# Patient Record
Sex: FEMALE | Race: WHITE
[De-identification: names, ages, dates, MRNs, and addresses within clinical notes are randomized per-mention and may not be internally consistent; named-entity substitution may affect disease eponyms.]

---

## 2020-12-10 ENCOUNTER — HOSPITAL ENCOUNTER (OUTPATIENT)
Dept: HOSPITAL 75 - RAD | Age: 30
End: 2020-12-10
Attending: NURSE PRACTITIONER
Payer: COMMERCIAL

## 2020-12-10 DIAGNOSIS — Z34.92: Primary | ICD-10-CM

## 2020-12-10 DIAGNOSIS — Z3A.19: ICD-10-CM

## 2020-12-10 PROCEDURE — 76805 OB US >/= 14 WKS SNGL FETUS: CPT

## 2020-12-10 NOTE — DIAGNOSTIC IMAGING REPORT
INDICATION: Anatomy scan.



TECHNIQUE: Multiple real-time grayscale images were obtained over

the gravid uterus.



COMPARISON: None



FINDINGS: There is a single live fetus in a cephalic

presentation. Fetal heart rate was recorded at 160 bpm. Placenta

is anterior. Amniotic fluid volume is normal. Fetal kidneys,

bladder and stomach are unremarkable. Fetal brain is

unremarkable. There is four-chamber heart. There is three-vessel

cord with normal cord insertion. Fetal spine is unremarkable.



Biometrical measurements are as follows:

Biparietal 4.47 cm, age 19 weeks 4 days.

Head circumference 16.45 cm, age 19 weeks 2 days.

Abdominal circumference 14.18 cm, age 19 weeks 4 days.

Femur length 3.11 cm, age 19 weeks 5 days.



Sonographic estimate age: 19 weeks 4 days.

Sonographic estimated date of delivery: 05/02/2021.



Estimated Fetal Weight: 299 gm (+/- 44  gm).

LMP percentile: 53%.



Fetal heart rate: 160 beats per minute.



Fetal number: 1 of 1.



IMPRESSION: Single live IUP 19 weeks 4 days gestational age.

Estimated date of confinement sonographically is 05/02/2021.



Dictated by: 



  Dictated on workstation # [id number]

## 2021-02-05 ENCOUNTER — HOSPITAL ENCOUNTER (EMERGENCY)
Dept: HOSPITAL 75 - ER | Age: 31
Discharge: HOME | End: 2021-02-05
Payer: COMMERCIAL

## 2021-02-05 VITALS — HEIGHT: 63.78 IN | BODY MASS INDEX: 29.34 KG/M2 | WEIGHT: 169.76 LBS

## 2021-02-05 VITALS — SYSTOLIC BLOOD PRESSURE: 127 MMHG | DIASTOLIC BLOOD PRESSURE: 91 MMHG

## 2021-02-05 DIAGNOSIS — Z3A.27: ICD-10-CM

## 2021-02-05 DIAGNOSIS — R07.89: ICD-10-CM

## 2021-02-05 DIAGNOSIS — O26.892: Primary | ICD-10-CM

## 2021-02-05 DIAGNOSIS — Z20.822: ICD-10-CM

## 2021-02-05 PROCEDURE — 87635 SARS-COV-2 COVID-19 AMP PRB: CPT

## 2021-02-05 PROCEDURE — 99283 EMERGENCY DEPT VISIT LOW MDM: CPT

## 2021-02-05 NOTE — ED CHEST PAIN
General


Chief Complaint:  Chest Pain


Stated Complaint:  CP, 27 WKS PREGNANCY


Nursing Triage Note:  


ARRIVED VIA AMB TO ROOM 10 IN [facility] WITH COMPLAINTS OF WAKING UP 


WITH CHEST PAIN THIS AM.  WAS SENT HERE BY HER DR OFFICE.  PAIN IS REPRODUCABLE


Nursing Sepsis Screen:  No Definite Risk


Source:  patient


Exam Limitations:  no limitations





History of Present Illness


Date Seen by Provider:  Feb 5, 2021


Time Seen by Provider:  10:40


Initial Comments


This 30-year-old young lady presents to the emergency room with complaints of 

upper chest pain upon waking this morning.  Pain has been persistent.  It is 

exacerbated by expiration and by movement of the chest and arms.  She denies any

injury.  She has been doing work on preparing a nursery because she is 27 weeks 

gestational age.  She denies any cough, shortness of breath, GI symptoms, loss 

of taste or smell, or fever.  Dr. Pendleton is her obstetrician.





Allergies and Home Medications


Allergies


Coded Allergies:  


     No Known Drug Allergies (Unverified , 2/5/21)





Home Medications


No Active Prescriptions or Reported Meds





Patient Home Medication List


Home Medication List Reviewed:  Yes





Review of Systems


Review of Systems


Constitutional:  no symptoms reported


EENTM:  No Symptoms Reported


Respiratory:  See HPI


Cardiovascular:  See HPI


Gastrointestinal:  No Symptoms Reported


Genitourinary:  No Symptoms Reported


Musculoskeletal:  see HPI


Skin:  no symptoms reported


Psychiatric/Neurological:  No Symptoms Reported


Endocrine:  No Symptoms Reported


Hematologic/Lymphatic:  No Symptoms Reported





Past Medical-Social-Family Hx


Past Med/Social Hx:  Reviewed Nursing Past Med/Soc Hx


Patient Social History


Alcohol Use:  Denies Use


Smoking Status:  Never a Smoker


Recent Infectious Disease Expo:  No


Recent Hopitalizations:  No





Seasonal Allergies


Seasonal Allergies:  No





Past Medical History


Surgeries:  No


Respiratory:  No


Cardiac:  No


Neurological:  No


Pregnant:  Yes


Expected Date of Delivery:  May 3, 2021


Genitourinary:  No


Gastrointestinal:  No


Musculoskeletal:  No


Endocrine:  No


HEENT:  No


Cancer:  No


Psychosocial:  No


Integumentary:  No





Physical Exam


Vital Signs





Vital Signs - First Documented








 2/5/21





 10:35


 


Temp 36.0


 


Pulse 82


 


Resp 16


 


B/P (MAP) 127/91 (103)


 


Pulse Ox 99


 


O2 Delivery Room Air





Capillary Refill : Less Than 3 Seconds


Height, Weight, BMI


Height: '"


Weight: lbs. oz. kg; 29.00 BMI


Method:


General Appearance:  No Apparent Distress, WD/WN


HEENT:  PERRL/EOMI, Normal ENT Inspection


Neck:  Normal Inspection


Respiratory:  Lungs Clear, Normal Breath Sounds, No Accessory Muscle Use, No 

Respiratory Distress, Other (Upper central chest wall tender to palpation)


Cardiovascular:  Regular Rate, Rhythm, No Edema, Normal Peripheral Pulses


Gastrointestinal:  Normal Bowel Sounds, Non Tender, Soft, Other (Appropriately 

gravid for gestational age)


Extremity:  Normal Inspection, Non Tender, No Calf Tenderness, No Pedal Edema


Neurologic/Psychiatric:  Alert, Oriented x3, No Motor/Sensory Deficits, Normal 

Mood/Affect, CNs II-XII Norm as Tested


Skin:  Normal Color, Warm/Dry





Progress/Results/Core Measures


Results/Orders


Lab Results





Laboratory Tests








Test


 2/5/21


10:44 Range/Units


 


 


Coronavirus 2019 ([facility]IMI) Negative  Negative  








My Orders





Orders - RUSH HAMILTON MD


Ondansetron  Oral Dissolve Tab (Zofran (2/5/21 10:50)


Lidocaine 2% Viscous 15 Ml (Xylocaine Vi (2/5/21 11:00)


Antacid  Suspension (Mylanta  Suspension (2/5/21 11:00)


Antacid  Suspension (Mylanta  Suspension (2/5/21 10:48)


Lidocaine 2% Viscous 15 Ml (Xylocaine Vi (2/5/21 10:49)


Ondansetron  Oral Dissolve Tab (Zofran (2/5/21 10:49)


Covid 19 Inhouse Test (2/5/21 10:58)





Medications Given in ED





Current Medications








 Medications  Dose


 Ordered  Sig/Katarzyna


 Route  Start Time


 Stop Time Status Last Admin


Dose Admin


 


 Al Hydrox/Mg


 Hydrox/Simethicone  30 ml  ONCE  ONCE


 PO  2/5/21 11:00


 2/5/21 11:01 DC 2/5/21 10:54


30 ML


 


 Lidocaine HCl  15 ml  ONCE  ONCE


 PO  2/5/21 11:00


 2/5/21 11:01 DC 2/5/21 10:54


15 ML








Vital Signs/I&O











 2/5/21 2/5/21





 10:35 11:47


 


Temp 36.0 36.0


 


Pulse 82 82


 


Resp 16 16


 


B/P (MAP) 127/91 (103) 127/91 (103)


 


Pulse Ox 99 99


 


O2 Delivery Room Air 














Blood Pressure Mean:                    103











Progress


Progress Note :  


Progress Note


Patient was treated with Zofran and a GI cocktail.  This did not really improve 

her symptoms.  Pulmonary embolism was considered but suspicion was very low as 

she did not meet any of the PERC rule criteria.  Rapid Covid was negative.  

Ultimately patient was given reassurance and discharged.





Departure


Impression





   Primary Impression:  


   Chest wall pain


Disposition:  01 HOME, SELF-CARE


Condition:  Stable





Departure-Patient Inst.


Decision time for Depature:  11:36


Referrals:  


ALYSE ROLLE MD (PCP/Family)


Primary Care Physician


Patient Instructions:  Chest Pain That Is Not Caused by the Heart (DC)





Add. Discharge Instructions:  


The nature of your chest pain appears to be musculoskeletal.  You may take 

Tylenol (acetaminophen) up to 1000 mg every 6 hours as needed.  You may 

additionally try gentle heat or cool compresses in 20-minute intervals.


Return to care if you develop worsening or new symptoms such as shortness of 

breath, lightheadedness, deeper chest pain, vomiting, fever, etc.  Also consider

repeat COVID-19 testing if you get additional symptoms that could be associated 

with COVID-19.


Call with questions or concerns.





All discharge instructions reviewed with patient and/or family. Voiced 

understanding.


Scripts


No Active Prescriptions or Reported Meds





Copy


Copies To 1:   MIC PENDLETON JOSHUA T MD         Feb 5, 2021 11:10

## 2021-03-20 ENCOUNTER — HOSPITAL ENCOUNTER (OUTPATIENT)
Dept: HOSPITAL 75 - WSO | Age: 31
Setting detail: OBSERVATION
LOS: 2 days | Discharge: HOME | End: 2021-03-22
Attending: OBSTETRICS & GYNECOLOGY | Admitting: OBSTETRICS & GYNECOLOGY
Payer: COMMERCIAL

## 2021-03-20 VITALS — SYSTOLIC BLOOD PRESSURE: 109 MMHG | DIASTOLIC BLOOD PRESSURE: 71 MMHG

## 2021-03-20 VITALS — SYSTOLIC BLOOD PRESSURE: 116 MMHG | DIASTOLIC BLOOD PRESSURE: 89 MMHG

## 2021-03-20 VITALS — DIASTOLIC BLOOD PRESSURE: 72 MMHG | SYSTOLIC BLOOD PRESSURE: 114 MMHG

## 2021-03-20 VITALS — BODY MASS INDEX: 30.98 KG/M2 | HEIGHT: 64.02 IN | WEIGHT: 181.44 LBS

## 2021-03-20 VITALS — DIASTOLIC BLOOD PRESSURE: 70 MMHG | SYSTOLIC BLOOD PRESSURE: 108 MMHG

## 2021-03-20 DIAGNOSIS — O60.03: Primary | ICD-10-CM

## 2021-03-20 DIAGNOSIS — O26.893: ICD-10-CM

## 2021-03-20 DIAGNOSIS — Z79.899: ICD-10-CM

## 2021-03-20 DIAGNOSIS — Z3A.35: ICD-10-CM

## 2021-03-20 DIAGNOSIS — O99.013: ICD-10-CM

## 2021-03-20 DIAGNOSIS — M54.5: ICD-10-CM

## 2021-03-20 LAB
APTT PPP: YELLOW S
BACTERIA #/AREA URNS HPF: NEGATIVE /HPF
BASOPHILS # BLD AUTO: 0.1 10^3/UL (ref 0–0.1)
BASOPHILS NFR BLD AUTO: 0 % (ref 0–10)
BILIRUB UR QL STRIP: NEGATIVE
EOSINOPHIL # BLD AUTO: 0.2 10^3/UL (ref 0–0.3)
EOSINOPHIL NFR BLD AUTO: 2 % (ref 0–10)
FIBRINOGEN PPP-MCNC: CLEAR MG/DL
GLUCOSE UR STRIP-MCNC: NEGATIVE MG/DL
HCT VFR BLD CALC: 33 % (ref 35–52)
HGB BLD-MCNC: 11.1 G/DL (ref 11.5–16)
KETONES UR QL STRIP: NEGATIVE
LEUKOCYTE ESTERASE UR QL STRIP: NEGATIVE
LYMPHOCYTES # BLD AUTO: 2.7 10^3/UL (ref 1–4)
LYMPHOCYTES NFR BLD AUTO: 18 % (ref 12–44)
MANUAL DIFFERENTIAL PERFORMED BLD QL: NO
MCH RBC QN AUTO: 31 PG (ref 25–34)
MCHC RBC AUTO-ENTMCNC: 34 G/DL (ref 32–36)
MCV RBC AUTO: 90 FL (ref 80–99)
MONOCYTES # BLD AUTO: 1.5 10^3/UL (ref 0–1)
MONOCYTES NFR BLD AUTO: 10 % (ref 0–12)
NEUTROPHILS # BLD AUTO: 9.9 10^3/UL (ref 1.8–7.8)
NEUTROPHILS NFR BLD AUTO: 69 % (ref 42–75)
NITRITE UR QL STRIP: NEGATIVE
PH UR STRIP: 6 [PH] (ref 5–9)
PLATELET # BLD: 209 10^3/UL (ref 130–400)
PMV BLD AUTO: 10.4 FL (ref 9–12.2)
PROT UR QL STRIP: NEGATIVE
RBC #/AREA URNS HPF: (no result) /HPF
SP GR UR STRIP: <=1.005 (ref 1.02–1.02)
SQUAMOUS #/AREA URNS HPF: (no result) /HPF
WBC # BLD AUTO: 14.5 10^3/UL (ref 4.3–11)
WBC #/AREA URNS HPF: (no result) /HPF

## 2021-03-20 PROCEDURE — 86901 BLOOD TYPING SEROLOGIC RH(D): CPT

## 2021-03-20 PROCEDURE — 81000 URINALYSIS NONAUTO W/SCOPE: CPT

## 2021-03-20 PROCEDURE — 99211 OFF/OP EST MAY X REQ PHY/QHP: CPT

## 2021-03-20 PROCEDURE — 86850 RBC ANTIBODY SCREEN: CPT

## 2021-03-20 PROCEDURE — 96372 THER/PROPH/DIAG INJ SC/IM: CPT

## 2021-03-20 PROCEDURE — 86900 BLOOD TYPING SEROLOGIC ABO: CPT

## 2021-03-20 PROCEDURE — 96376 TX/PRO/DX INJ SAME DRUG ADON: CPT

## 2021-03-20 PROCEDURE — 87088 URINE BACTERIA CULTURE: CPT

## 2021-03-20 PROCEDURE — 85025 COMPLETE CBC W/AUTO DIFF WBC: CPT

## 2021-03-20 PROCEDURE — 36415 COLL VENOUS BLD VENIPUNCTURE: CPT

## 2021-03-20 PROCEDURE — 96374 THER/PROPH/DIAG INJ IV PUSH: CPT

## 2021-03-20 PROCEDURE — 76816 OB US FOLLOW-UP PER FETUS: CPT

## 2021-03-20 PROCEDURE — 96361 HYDRATE IV INFUSION ADD-ON: CPT

## 2021-03-20 RX ADMIN — BETAMETHASONE ACETATE AND BETAMETHASONE SODIUM PHOSPHATE SCH MG: 3; 3 INJECTION, SUSPENSION INTRA-ARTICULAR; INTRALESIONAL; INTRAMUSCULAR; SOFT TISSUE at 23:06

## 2021-03-20 NOTE — HISTORY & PHYSICAL-OB
OB - Chief Complaint & HPI


Date/Time


Date of Admission:


Date of Admission:


Date seen by a Provider:  Mar 20, 2021


Time Seen by a Provider:  20:00





Chief Complaint/History


OB-Reason for Admission/Chief:   Labor (Presents with complaint of 

irregular contractions (she thought BH contractions) and pelvic pressure.  

Emptying bladder fully, no constipation, no DC, No LOF, No VB, good FM.  On 

monitor was hemal irregularly at first.  Cervix closed.  But then noted to

be hemal every 3-4 minutes and pain associated with contractions.  2-3 

with sitting but 7 when lying down.  Though no cervical change, admitted for PTL

due to pain and continued contractions.  UA on admit very hydrated.  Plan 

admission for tocolysis, antibiotics for GBS prophylaxis and betamethasone for 

lung maturity.  To late to consider MgSO4 for neuro protection.  Will start 

procardia protocol.  )


Hx :  1


Hx Para:  0


Expected Date of Delivery:  Mar 20, 2021


Gestational Age in Weeks:  33


Gestational Age in Days:  5


Admission Nurse Assessment Rev:  Yes


Other


O+,-


HBsAg -


Hep C -


HIV -


Rub I


VDRL NR


GBS unknown





Allergies and Home Medications


Allergies


Coded Allergies:  


     No Known Drug Allergies (Unverified , 21)





Home Medications


Acyclovir 200 Mg/5 Ml Oral.susp, 200 MG PO TID, (Reported)


Pnv Cmb#21/Iron/Folic Acid 1 Each Tablet, 1 EACH PO DAILY, (Reported)





Patient Home Medication List


Home Medication List Reviewed:  Yes





OB - History


Hx of Present Pregnancy


Prenatal Care:  Yes


Ultrasounds:  Normal mid trimester US


Obstetrical Complications:  None


Medical Complications:  None


Other Pregnancy Concerns:


shingles outbreak after Coronavirus vaccine


Completing Acyclovir course





Obstetrical History


Hx :  1


Hx Para:  0


Hx Total # of Abortions (Spona:  0





Patient Past Medical History





recent shingles out break





Social History/Family History


Alcohol Use:  Denies Use


Recreational Drug Use:  No


Smoking Cessation:  Never smoker





Immunizations


Hepatitis B:  Yes


Tetanus Booster (TDap):  Less than 5yrs


Rubella:  immune


RPR/VDRL:  Negative


GBS Status:  Unknown


HBsAG:  Negative





OB - Admission Exam


Physical Exam


Vitals:





Vital Signs








 3/20/21





 20:28


 


Temp 37.0


 


Pulse 69


 


Resp 18


 


Pulse Ox 97


 


O2 Delivery Room Air








Heart:  Rhythm Normal


Lungs:  Clear


Abdomen:  Gravid


Cervical Dilatation:  None


Effacement:  50%


Membranes:  Intact


Fetal Heart Rate:  150's


Accelerations:  Accelerations Present


Decelerations:  No Decelerations


Short Term Variability:  Present


Long Term Variability:  Average (6-25)


Contractions on Admission:  < 5 Minutes Apart


Intensity:  Moderate


Labs





Laboratory Tests








Test


 3/20/21


20:20 Range/Units


 


 


Urine Color YELLOW   


 


Urine Clarity CLEAR   


 


Urine pH 6.0  5-9  


 


Urine Specific Gravity <=1.005  1.016-1.022  


 


Urine Protein NEGATIVE  NEGATIVE  


 


Urine Glucose (UA) NEGATIVE  NEGATIVE  


 


Urine Ketones NEGATIVE  NEGATIVE  


 


Urine Nitrite NEGATIVE  NEGATIVE  


 


Urine Bilirubin NEGATIVE  NEGATIVE  


 


Urine Urobilinogen 0.2  < = 1.0  MG/DL


 


Urine Leukocyte Esterase NEGATIVE  NEGATIVE  


 


Urine RBC (Auto) NEGATIVE  NEGATIVE  


 


Urine RBC NONE   /HPF


 


Urine WBC RARE   /HPF


 


Urine Squamous Epithelial


Cells 0-2 


  /HPF





 


Urine Crystals NONE   /LPF


 


Urine Bacteria NEGATIVE   /HPF


 


Urine Casts NONE   /LPF


 


Urine Mucus TRACE   /LPF


 


Urine Culture Indicated NO   











OB - Assessment/Plan/Diagnosis


Assessment


Assessment:  IUP - 


Admission Dx


1.  Low back pain


2.   labor


3.  33 5/7 weeks


4.  recent shingles outbreak


Admission Status:  Observation











MIC PENDLETON DO               Mar 20, 2021 22:39

## 2021-03-21 VITALS — DIASTOLIC BLOOD PRESSURE: 58 MMHG | SYSTOLIC BLOOD PRESSURE: 104 MMHG

## 2021-03-21 VITALS — DIASTOLIC BLOOD PRESSURE: 63 MMHG | SYSTOLIC BLOOD PRESSURE: 99 MMHG

## 2021-03-21 VITALS — DIASTOLIC BLOOD PRESSURE: 60 MMHG | SYSTOLIC BLOOD PRESSURE: 101 MMHG

## 2021-03-21 VITALS — SYSTOLIC BLOOD PRESSURE: 84 MMHG | DIASTOLIC BLOOD PRESSURE: 48 MMHG

## 2021-03-21 VITALS — DIASTOLIC BLOOD PRESSURE: 67 MMHG | SYSTOLIC BLOOD PRESSURE: 107 MMHG

## 2021-03-21 VITALS — DIASTOLIC BLOOD PRESSURE: 50 MMHG | SYSTOLIC BLOOD PRESSURE: 88 MMHG

## 2021-03-21 VITALS — SYSTOLIC BLOOD PRESSURE: 98 MMHG | DIASTOLIC BLOOD PRESSURE: 64 MMHG

## 2021-03-21 VITALS — DIASTOLIC BLOOD PRESSURE: 54 MMHG | SYSTOLIC BLOOD PRESSURE: 95 MMHG

## 2021-03-21 VITALS — DIASTOLIC BLOOD PRESSURE: 48 MMHG | SYSTOLIC BLOOD PRESSURE: 85 MMHG

## 2021-03-21 VITALS — SYSTOLIC BLOOD PRESSURE: 107 MMHG | DIASTOLIC BLOOD PRESSURE: 67 MMHG

## 2021-03-21 VITALS — SYSTOLIC BLOOD PRESSURE: 107 MMHG | DIASTOLIC BLOOD PRESSURE: 58 MMHG

## 2021-03-21 VITALS — DIASTOLIC BLOOD PRESSURE: 62 MMHG | SYSTOLIC BLOOD PRESSURE: 105 MMHG

## 2021-03-21 VITALS — SYSTOLIC BLOOD PRESSURE: 122 MMHG | DIASTOLIC BLOOD PRESSURE: 64 MMHG

## 2021-03-21 VITALS — DIASTOLIC BLOOD PRESSURE: 54 MMHG | SYSTOLIC BLOOD PRESSURE: 93 MMHG

## 2021-03-21 RX ADMIN — NIFEDIPINE SCH MG: 10 CAPSULE ORAL at 10:23

## 2021-03-21 RX ADMIN — NIFEDIPINE SCH MG: 10 CAPSULE ORAL at 23:07

## 2021-03-21 RX ADMIN — WATER SCH MLS/HR: 1 INJECTION INTRAMUSCULAR; INTRAVENOUS; SUBCUTANEOUS at 10:24

## 2021-03-21 RX ADMIN — NIFEDIPINE SCH MG: 10 CAPSULE ORAL at 02:35

## 2021-03-21 RX ADMIN — WATER SCH MLS/HR: 1 INJECTION INTRAMUSCULAR; INTRAVENOUS; SUBCUTANEOUS at 18:51

## 2021-03-21 RX ADMIN — NIFEDIPINE SCH MG: 10 CAPSULE ORAL at 14:57

## 2021-03-21 RX ADMIN — DOCUSATE SODIUM SCH MG: 100 CAPSULE ORAL at 08:30

## 2021-03-21 RX ADMIN — Medication SCH MG: at 20:52

## 2021-03-21 RX ADMIN — WATER SCH MLS/HR: 1 INJECTION INTRAMUSCULAR; INTRAVENOUS; SUBCUTANEOUS at 14:58

## 2021-03-21 RX ADMIN — WATER SCH MLS/HR: 1 INJECTION INTRAMUSCULAR; INTRAVENOUS; SUBCUTANEOUS at 23:06

## 2021-03-21 RX ADMIN — NIFEDIPINE SCH MG: 10 CAPSULE ORAL at 06:09

## 2021-03-21 RX ADMIN — BETAMETHASONE ACETATE AND BETAMETHASONE SODIUM PHOSPHATE SCH MG: 3; 3 INJECTION, SUSPENSION INTRA-ARTICULAR; INTRALESIONAL; INTRAMUSCULAR; SOFT TISSUE at 23:07

## 2021-03-21 RX ADMIN — DOCUSATE SODIUM SCH MG: 100 CAPSULE ORAL at 20:52

## 2021-03-21 RX ADMIN — WATER SCH MLS/HR: 1 INJECTION INTRAMUSCULAR; INTRAVENOUS; SUBCUTANEOUS at 06:09

## 2021-03-21 RX ADMIN — NIFEDIPINE SCH MG: 10 CAPSULE ORAL at 18:51

## 2021-03-21 RX ADMIN — WATER SCH MLS/HR: 1 INJECTION INTRAMUSCULAR; INTRAVENOUS; SUBCUTANEOUS at 02:36

## 2021-03-21 NOTE — PROGRESS NOTE
Progress Note


Assessment/Plan


Date Seen by Provider:  Mar 21, 2021


Time Seen by Provider:  09:30


Events since last exam


Received BMT x 1, tocolysis began with procardia, has received 3 doses so far.


BP low with procardia but patient is asymptomatic


continued to contract regularly, but less painfully until about 0500 when 

contractions spaced out.  And now are minimal


Pain improved but did require po medication last night


Assessment/Plan





                          VS - Last 72 Hours, by Label








 3/20/21 3/20/21 3/20/21 3/20/21





 20:28 21:00 23:07 23:23


 


Temp 37.0 37.0  


 


Pulse 69 69 62 61


 


Resp 18 18 18 18


 


B/P (MAP)  114/72 (86) 109/71 (84) 108/70 (83)


 


Pulse Ox 97 98 99 100


 


O2 Delivery Room Air   


 


    





 3/20/21 3/21/21 3/21/21 3/21/21





 23:40 00:00 01:00 02:00


 


Pulse 89 72 72 


 


Resp 18 18 18 18


 


B/P (MAP) 116/89 (98) 107/67 (80) 107/67 (80) 


 


Pulse Ox 98 99 99 





 3/21/21 3/21/21 3/21/21 3/21/21





 03:00 04:00 05:00 06:00


 


Temp 37.0   


 


Pulse 64 75 82 68


 


Resp 18 18 18 18


 


B/P (MAP) 93/54 (67) 107/58 (74) 84/48 (60) 88/50 (63)


 


    





 3/21/21   





 07:00   


 


Pulse 82   


 


Resp 18   


 


B/P (MAP) 85/48 (60)   


 


Pulse Ox 93   








Laboratory Tests








Test


 3/20/21


20:20 3/20/21


23:35 Range/Units


 


 


Urine Color YELLOW    


 


Urine Clarity CLEAR    


 


Urine pH 6.0   5-9  


 


Urine Specific Gravity <=1.005   1.016-1.022  


 


Urine Protein NEGATIVE   NEGATIVE  


 


Urine Glucose (UA) NEGATIVE   NEGATIVE  


 


Urine Ketones NEGATIVE   NEGATIVE  


 


Urine Nitrite NEGATIVE   NEGATIVE  


 


Urine Bilirubin NEGATIVE   NEGATIVE  


 


Urine Urobilinogen 0.2   < = 1.0  MG/DL


 


Urine Leukocyte Esterase NEGATIVE   NEGATIVE  


 


Urine RBC (Auto) NEGATIVE   NEGATIVE  


 


Urine RBC NONE    /HPF


 


Urine WBC RARE    /HPF


 


Urine Squamous Epithelial


Cells 0-2 


 


  /HPF





 


Urine Crystals NONE    /LPF


 


Urine Bacteria NEGATIVE    /HPF


 


Urine Casts NONE    /LPF


 


Urine Mucus TRACE    /LPF


 


Urine Culture Indicated NO    


 


White Blood Count


 


 14.5 H


 4.3-11.0


10^3/uL


 


Red Blood Count


 


 3.62 L


 3.80-5.11


10^6/uL


 


Hemoglobin  11.1 L 11.5-16.0  g/dL


 


Hematocrit  33 L 35-52  %


 


Mean Corpuscular Volume  90  80-99  fL


 


Mean Corpuscular Hemoglobin  31  25-34  pg


 


Mean Corpuscular Hemoglobin


Concent 


 34 


 32-36  g/dL





 


Red Cell Distribution Width  13.2  10.0-14.5  %


 


Platelet Count


 


 209 


 130-400


10^3/uL


 


Mean Platelet Volume  10.4  9.0-12.2  fL


 


Immature Granulocyte % (Auto)  1   %


 


Neutrophils (%) (Auto)  69  42-75  %


 


Lymphocytes (%) (Auto)  18  12-44  %


 


Monocytes (%) (Auto)  10  0-12  %


 


Eosinophils (%) (Auto)  2  0-10  %


 


Basophils (%) (Auto)  0  0-10  %


 


Neutrophils # (Auto)


 


 9.9 H


 1.8-7.8


10^3/uL


 


Lymphocytes # (Auto)


 


 2.7 


 1.0-4.0


10^3/uL


 


Monocytes # (Auto)


 


 1.5 H


 0.0-1.0


10^3/uL


 


Eosinophils # (Auto)


 


 0.2 


 0.0-0.3


10^3/uL


 


Basophils # (Auto)


 


 0.1 


 0.0-0.1


10^3/uL


 


Immature Granulocyte # (Auto)


 


 0.2 H


 0.0-0.1


10^3/uL





Plan to continue tocolysis until after 2nd dose of betamethasone.  2nd dose due 

at 2300 tonight 


Will continue admission until am.  BPP and US prior to DC tomorrow.





A:   contractions/labor at 33 6/7 weeks


Recent shingles outbreak after coronavirus vaccination





P:  tocolysis, Beta


Amp for GBS prophylaxis





Vitals


Last set of Vitals Signs





Vital Signs








  Date Time  Temp Pulse Resp B/P (MAP) Pulse Ox O2 Delivery O2 Flow Rate FiO2


 


3/21/21 07:00  82 18 85/48 (60) 93   


 


3/21/21 03:00 37.0       


 


3/20/21 20:28      Room Air  











Labs


Laboratory Tests


3/20/21 20:20: 


Urine Color YELLOW, Urine Clarity CLEAR, Urine pH 6.0, Urine Specific Gravity 

<=1.005, Urine Protein NEGATIVE, Urine Glucose (UA) NEGATIVE, Urine Ketones 

NEGATIVE, Urine Nitrite NEGATIVE, Urine Bilirubin NEGATIVE, Urine Urobilinogen 

0.2, Urine Leukocyte Esterase NEGATIVE, Urine RBC (Auto) NEGATIVE, Urine RBC 

NONE, Urine WBC RARE, Urine Squamous Epithelial Cells 0-2, Urine Crystals NONE, 

Urine Bacteria NEGATIVE, Urine Casts NONE, Urine Mucus TRACE, Urine Culture 

Indicated NO


3/20/21 23:35: 


White Blood Count 14.5H, Red Blood Count 3.62L, Hemoglobin 11.1L, Hematocrit 33L

, Mean Corpuscular Volume 90, Mean Corpuscular Hemoglobin 31, Mean Corpuscular 

Hemoglobin Concent 34, Red Cell Distribution Width 13.2, Platelet Count 209, 

Mean Platelet Volume 10.4, Immature Granulocyte % (Auto) 1, Neutrophils (%) 

(Auto) 69, Lymphocytes (%) (Auto) 18, Monocytes (%) (Auto) 10, Eosinophils (%) 

(Auto) 2, Basophils (%) (Auto) 0, Neutrophils # (Auto) 9.9H, Lymphocytes # 

(Auto) 2.7, Monocytes # (Auto) 1.5H, Eosinophils # (Auto) 0.2, Basophils # 

(Auto) 0.1, Immature Granulocyte # (Auto) 0.2H





Focused Exam


Respiratory:  Chest Non Tender


Cardiovascular:  Regular Rate, Rhythm











MIC PENDLETON DO               Mar 21, 2021 09:34

## 2021-03-22 VITALS — SYSTOLIC BLOOD PRESSURE: 90 MMHG | DIASTOLIC BLOOD PRESSURE: 52 MMHG

## 2021-03-22 VITALS — DIASTOLIC BLOOD PRESSURE: 59 MMHG | SYSTOLIC BLOOD PRESSURE: 105 MMHG

## 2021-03-22 VITALS — SYSTOLIC BLOOD PRESSURE: 98 MMHG | DIASTOLIC BLOOD PRESSURE: 54 MMHG

## 2021-03-22 RX ADMIN — DOCUSATE SODIUM SCH MG: 100 CAPSULE ORAL at 08:40

## 2021-03-22 RX ADMIN — NIFEDIPINE SCH MG: 10 CAPSULE ORAL at 02:48

## 2021-03-22 RX ADMIN — WATER SCH MLS/HR: 1 INJECTION INTRAMUSCULAR; INTRAVENOUS; SUBCUTANEOUS at 06:53

## 2021-03-22 RX ADMIN — WATER SCH MLS/HR: 1 INJECTION INTRAMUSCULAR; INTRAVENOUS; SUBCUTANEOUS at 02:48

## 2021-03-22 RX ADMIN — NIFEDIPINE SCH MG: 10 CAPSULE ORAL at 06:53

## 2021-03-22 RX ADMIN — NIFEDIPINE SCH MG: 10 CAPSULE ORAL at 10:50

## 2021-03-22 RX ADMIN — Medication SCH MG: at 08:40

## 2021-03-22 NOTE — DIAGNOSTIC IMAGING REPORT
INDICATION: Assessment for size, position, and dates.



TECHNIQUE: Multiple limited real-time grayscale images were

obtained over the gravid uterus.



COMPARISON: 12/10/2020.



FINDINGS: Limited obstetrical sonogram imaging demonstrates a

single viable intrauterine pregnancy to currently be in a

cephalic presentation. Normal amount of amniotic fluid. Placenta

is anterior and without evidence for previa. Probable placental

lake present.



Biometrical measurements are as follows:

Biparietal 8.7 cm, age 35 weeks 1 days.

Head circumference 32.20 cm, age 36 weeks 3 days.

Abdominal circumference 30.99 cm, age 35 weeks 0 days.

Femur length 6.47 cm, age 33 weeks 3 days.



Sonographic estimate age: 35 weeks 0 days.

Sonographic estimated date of delivery: 4/26/21.



Estimated Fetal Weight: 2489 gm 



Fetal heart rate: 109 beats per minute.



Fetal number: 1 of 1.



IMPRESSION:

1. Limited obstetrical sonogram imaging demonstrates a single

viable intrauterine pregnancy, currently in a cephalic

presentation. Full fetal anatomical assessment was not performed.

Sonographic estimated age is approximately 35 weeks 0 days for an

estimated date of delivery of April 26, 2021.



Dictated by: 



  Dictated on workstation # [id number]

## 2021-03-22 NOTE — PROGRESS NOTE
Standard Progress Note


Progress Notes/Assess & Plan


Date Seen by a Provider:  Mar 22, 2021


Time Seen by a Provider:  08:30


Progress/Assessment & Plan


Had some redness of the extremities with the procardia.  But has had minimal 

contractions.





She has received betamethasone, prophylatic antibtiotics and procardia. Will 

await the  and the [facility] and then DC to home


Final Diagnosis


 labor at 35 weeks, with tocolysis


Mild antepartum anemia











MIC PENDLETON DO               Mar 22, 2021 08:22

## 2021-03-22 NOTE — DISCHARGE INST-WOMEN'S SERVICE
Discharge Inst-Women's Serv


Depart Medication/Instructions


New, Converted or Re-Newed RX:  RX on Chart


Final Diagnosis


 labor


Problems Reviewed?:  Yes





Consults/Follow Up


Additional Follow Up:  Yes (1 week with Dr pendleton (NST))





Activity


Activity:  Activity as Tolerated


NO SMOKING:  NO SMOKING


Nothing Inside Vagina:  No Douching, No Saxon, No Tampons





Diet


Discharge Diet:  No Restrictions


Symptoms to Report to :  Fever Over 101 Degrees F





more than 6 contractions per hour for 2 consequetive hours.  vaginal


bleeding, leakage of fluid, decreased fetal movement.


For Any Problems or Questions:  Contact Your Physician











MIC PENDLETON DO               Mar 22, 2021 09:47

## 2021-03-29 ENCOUNTER — HOSPITAL ENCOUNTER (OUTPATIENT)
Dept: HOSPITAL 75 - LDRP | Age: 31
Discharge: HOME | End: 2021-03-29
Attending: OBSTETRICS & GYNECOLOGY
Payer: COMMERCIAL

## 2021-03-29 ENCOUNTER — HOSPITAL ENCOUNTER (EMERGENCY)
Dept: HOSPITAL 75 - ER | Age: 31
Discharge: HOME | End: 2021-03-29
Payer: COMMERCIAL

## 2021-03-29 VITALS — BODY MASS INDEX: 30.71 KG/M2 | WEIGHT: 179.9 LBS | HEIGHT: 63.98 IN

## 2021-03-29 VITALS — SYSTOLIC BLOOD PRESSURE: 102 MMHG | DIASTOLIC BLOOD PRESSURE: 65 MMHG

## 2021-03-29 VITALS — BODY MASS INDEX: 30.9 KG/M2 | HEIGHT: 64.02 IN | WEIGHT: 181 LBS

## 2021-03-29 VITALS — SYSTOLIC BLOOD PRESSURE: 109 MMHG | DIASTOLIC BLOOD PRESSURE: 67 MMHG

## 2021-03-29 DIAGNOSIS — O99.413: Primary | ICD-10-CM

## 2021-03-29 DIAGNOSIS — R00.2: ICD-10-CM

## 2021-03-29 DIAGNOSIS — Z3A.35: ICD-10-CM

## 2021-03-29 LAB
APTT PPP: YELLOW S
BACTERIA #/AREA URNS HPF: (no result) /HPF
BILIRUB UR QL STRIP: NEGATIVE
FIBRINOGEN PPP-MCNC: CLEAR MG/DL
GLUCOSE UR STRIP-MCNC: NEGATIVE MG/DL
KETONES UR QL STRIP: NEGATIVE
LEUKOCYTE ESTERASE UR QL STRIP: NEGATIVE
NITRITE UR QL STRIP: NEGATIVE
PH UR STRIP: 6.5 [PH] (ref 5–9)
PROT UR QL STRIP: NEGATIVE
RBC #/AREA URNS HPF: (no result) /HPF
SP GR UR STRIP: 1.01 (ref 1.02–1.02)
SQUAMOUS #/AREA URNS HPF: (no result) /HPF
WBC #/AREA URNS HPF: (no result) /HPF

## 2021-03-29 PROCEDURE — 99213 OFFICE O/P EST LOW 20 MIN: CPT

## 2021-03-29 PROCEDURE — 93005 ELECTROCARDIOGRAM TRACING: CPT

## 2021-03-29 PROCEDURE — 81000 URINALYSIS NONAUTO W/SCOPE: CPT

## 2021-03-29 NOTE — ED CARDIAC GENERAL
History of Present Illness


General


Chief Complaint:  Cardiac/General Problems


Stated Complaint:  HEART PALPITATIONS/DIZZY 35 WKS PREG


Source:  patient


Exam Limitations:  no limitations





History of Present Illness


Date Seen by Provider:  Mar 29, 2021


Time Seen by Provider:  09:04


Initial Comments


Patient is a 30-year-old female who presents to the emergency department today 

with a chief complaint of feeling heart palpitations this morning after she got 

up and started getting ready for her day.  Patient is approximately 35 weeks 

pregnant as a .  She gets her prenatal care through Dr. Pendleton.  Patient was 

seen about a week and a half ago with a diagnosis of  labor.  She was 

started on nifedipine at this time.  Patient denies any abdominal pain or 

cramping today.  No loss of fluid.  No urinary complaints.  No recent illnesses 

such as fevers, chills, cough or congestion.  Patient states that she was 

reading up about the nifedipine and read that it can cause tachycardia.  She is 

concerned about taking this medication during her pregnancy.





All other review of systems reviewed and negative except as stated above.


Timing/Duration:  1 hour


Severity:  mild


Location:  central


Activities at Onset:  activity


Prior CP/Workup:  no prior chest pain, no prior cardiac workup


NTG SL PTA:  No


ASA po PTA:  No





Allergies and Home Medications


Allergies


Coded Allergies:  


     No Known Drug Allergies (Unverified , 21)





Home Medications


Acetaminophen 500 Mg Tablet, 1,000 MG PO Q8H PRN for PAIN-MILD (1-4)


   Prescribed by: MIC PENDLETON on 3/22/21 0946


Acyclovir 200 Mg/5 Ml Oral.susp, 200 MG PO TID, (Reported)


Nifedipine 20 Mg Capsule, 20 MG PO Q6H


   Prescribed by: MIC PENDLETON on 3/22/21 0946


Oxycodone Hcl 5 Mg Tab, 5 MG PO Q6H PRN for PAIN-SEVERE (8-10)


   Prescribed by: MIC PENDLETON on 3/22/21 0946


Pnv Cmb#21/Iron/Folic Acid 1 Each Tablet, 1 EACH PO DAILY, (Reported)





Patient Home Medication List


Home Medication List Reviewed:  Yes





Review of Systems


Review of Systems


Constitutional:  see HPI


EENTM:  No Symptoms Reported


Respiratory:  No Symptoms Reported


Cardiovascular:  Lightheadedness, Palpitations


Gastrointestinal:  No Symptoms Reported


Genitourinary:  No Symptoms Reported


Musculoskeletal:  no symptoms reported


Skin:  no symptoms reported


Psychiatric/Neurological:  Anxiety





All Other Systems Reviewed


Negative Unless Noted:  Yes





Past Medical-Social-Family Hx


Patient Social History


Alcohol Use:  Denies Use


Smoking Status:  Never a Smoker


Recent Hopitalizations:  No





Immunizations Up To Date


Tetanus Booster (TDap):  Less than 5yrs





Seasonal Allergies


Seasonal Allergies:  No





Past Medical History


Surgeries:  No


Respiratory:  No


Cardiac:  No


Neurological:  No


Genitourinary:  No


Gastrointestinal:  No


Musculoskeletal:  No


Endocrine:  No


HEENT:  No


Cancer:  No


Psychosocial:  No


Integumentary:  No





Physical Exam


Vital Signs





Vital Signs - First Documented








 3/29/21





 08:58


 


Temp 35.8


 


Pulse 100


 


Resp 18


 


B/P (MAP) 108/75 (86)


 


Pulse Ox 97


 


O2 Delivery Room Air





Capillary Refill : Less Than 3 Seconds


Height, Weight, BMI


Height: '"


Weight: lbs. oz. kg; 31.12 BMI


Method:


General Appearance:  No Apparent Distress, WD/WN


Neck:  Normal Inspection


Respiratory:  Lungs Clear, Normal Breath Sounds, No Accessory Muscle Use, No 

Respiratory Distress


Cardiovascular:  Regular Rate, Rhythm


Gastrointestinal:  Normal Bowel Sounds, Non Tender, Soft


Extremity:  Normal Inspection, Normal Range of Motion, Non Tender, No Calf 

Tenderness


Neurologic/Psychiatric:  Alert, Oriented x3, Normal Mood/Affect


Skin:  Normal Color, Warm/Dry





Progress/Results/Core Measures


Results/Orders


Vital Signs/I&O











 3/29/21





 08:58


 


Temp 35.8


 


Pulse 100


 


Resp 18


 


B/P (MAP) 108/75 (86)


 


Pulse Ox 97


 


O2 Delivery Room Air











Progress


Progress Note :  


   Time:  09:14


Progress Note


Fetal heart tones upper 150s low 160s.  Patient looks well in no acute distress.

 Reassurance is given.  Case discussed with Dr. Villasenor who is on for 

OB/GYN.  He recommends that the patient go to labor and delivery for monitoring.

 Patient will be discharged from the ER to room 302 on labor and delivery for 

monitoring.


Initial ECG Impression Date:  Mar 29, 2021


Initial ECG Impression Time:  09:03


Initial ECG Rate:  92


Initial ECG Rhythm:  Normal Sinus


Initial ECG Intervals:  Normal


Initial ECG Impression:  Normal


Initial ECG Comparisson:  No Previous ECG Available





Departure


Impression





   Primary Impression:  


   Palpitations


Disposition:  01 HOME, SELF-CARE


Condition:  Stable





Departure-Patient Inst.


Decision time for Depature:  09:26


Referrals:  


ALYSE ROLLE MD (PCP/Family)


Primary Care Physician


Patient Instructions:  Palpitations





Add. Discharge Instructions:  


Please go to labor and delivery for monitoring after discharge from the ER.





Return to the emergency room for any new concerns of chest pain, shortness of 

breath further palpitations or other emergent symptoms.











SHREE WEBSTER MD         Mar 29, 2021 09:12

## 2021-03-30 NOTE — PHYSICIAN QUERY-FINAL DX
Clinic Account Progress/Dx


Physician Query:


Please give diagnosis





Please include # weeks gestation


Date of Service





Mar 29, 2021 at 09:36











[place]                    Mar 30, 2021 08:04

## 2021-04-25 ENCOUNTER — HOSPITAL ENCOUNTER (EMERGENCY)
Dept: HOSPITAL 75 - LDRP | Age: 31
End: 2021-04-25
Payer: COMMERCIAL

## 2021-04-25 ENCOUNTER — HOSPITAL ENCOUNTER (OUTPATIENT)
Dept: HOSPITAL 75 - WSO | Age: 31
Setting detail: OBSERVATION
LOS: 1 days | Discharge: HOME | End: 2021-04-26
Attending: OBSTETRICS & GYNECOLOGY | Admitting: OBSTETRICS & GYNECOLOGY
Payer: COMMERCIAL

## 2021-04-25 VITALS — SYSTOLIC BLOOD PRESSURE: 103 MMHG | DIASTOLIC BLOOD PRESSURE: 56 MMHG

## 2021-04-25 VITALS
DIASTOLIC BLOOD PRESSURE: 68 MMHG | DIASTOLIC BLOOD PRESSURE: 68 MMHG | SYSTOLIC BLOOD PRESSURE: 127 MMHG | SYSTOLIC BLOOD PRESSURE: 127 MMHG

## 2021-04-25 VITALS — HEIGHT: 63.98 IN | WEIGHT: 194.89 LBS | BODY MASS INDEX: 33.27 KG/M2

## 2021-04-25 VITALS
DIASTOLIC BLOOD PRESSURE: 78 MMHG | DIASTOLIC BLOOD PRESSURE: 78 MMHG | SYSTOLIC BLOOD PRESSURE: 130 MMHG | SYSTOLIC BLOOD PRESSURE: 130 MMHG

## 2021-04-25 VITALS — SYSTOLIC BLOOD PRESSURE: 109 MMHG | DIASTOLIC BLOOD PRESSURE: 62 MMHG

## 2021-04-25 VITALS — DIASTOLIC BLOOD PRESSURE: 70 MMHG | SYSTOLIC BLOOD PRESSURE: 120 MMHG

## 2021-04-25 VITALS — SYSTOLIC BLOOD PRESSURE: 112 MMHG | DIASTOLIC BLOOD PRESSURE: 74 MMHG

## 2021-04-25 VITALS — SYSTOLIC BLOOD PRESSURE: 120 MMHG | DIASTOLIC BLOOD PRESSURE: 71 MMHG

## 2021-04-25 VITALS — SYSTOLIC BLOOD PRESSURE: 96 MMHG | DIASTOLIC BLOOD PRESSURE: 55 MMHG

## 2021-04-25 VITALS — DIASTOLIC BLOOD PRESSURE: 78 MMHG | SYSTOLIC BLOOD PRESSURE: 128 MMHG

## 2021-04-25 VITALS
DIASTOLIC BLOOD PRESSURE: 67 MMHG | SYSTOLIC BLOOD PRESSURE: 119 MMHG | DIASTOLIC BLOOD PRESSURE: 67 MMHG | SYSTOLIC BLOOD PRESSURE: 119 MMHG

## 2021-04-25 VITALS
SYSTOLIC BLOOD PRESSURE: 117 MMHG | DIASTOLIC BLOOD PRESSURE: 72 MMHG | SYSTOLIC BLOOD PRESSURE: 117 MMHG | DIASTOLIC BLOOD PRESSURE: 72 MMHG

## 2021-04-25 VITALS — SYSTOLIC BLOOD PRESSURE: 128 MMHG | DIASTOLIC BLOOD PRESSURE: 78 MMHG

## 2021-04-25 VITALS — BODY MASS INDEX: 32.08 KG/M2 | HEIGHT: 65.55 IN | WEIGHT: 194.89 LBS

## 2021-04-25 VITALS — DIASTOLIC BLOOD PRESSURE: 75 MMHG | SYSTOLIC BLOOD PRESSURE: 122 MMHG

## 2021-04-25 VITALS — DIASTOLIC BLOOD PRESSURE: 59 MMHG | SYSTOLIC BLOOD PRESSURE: 113 MMHG

## 2021-04-25 VITALS — SYSTOLIC BLOOD PRESSURE: 125 MMHG | DIASTOLIC BLOOD PRESSURE: 63 MMHG

## 2021-04-25 DIAGNOSIS — I10: Primary | ICD-10-CM

## 2021-04-25 DIAGNOSIS — Z3A.38: ICD-10-CM

## 2021-04-25 DIAGNOSIS — O21.0: Primary | ICD-10-CM

## 2021-04-25 LAB
ALBUMIN SERPL-MCNC: 3.5 GM/DL (ref 3.2–4.5)
ALP SERPL-CCNC: 137 U/L (ref 40–136)
ALT SERPL-CCNC: 12 U/L (ref 0–55)
APTT PPP: YELLOW S
BACTERIA #/AREA URNS HPF: (no result) /HPF
BASOPHILS # BLD AUTO: 0 10^3/UL (ref 0–0.1)
BASOPHILS NFR BLD AUTO: 0 % (ref 0–10)
BILIRUB SERPL-MCNC: 0.6 MG/DL (ref 0.1–1)
BILIRUB UR QL STRIP: NEGATIVE
BUN/CREAT SERPL: 14
CALCIUM SERPL-MCNC: 9 MG/DL (ref 8.5–10.1)
CHLORIDE SERPL-SCNC: 107 MMOL/L (ref 98–107)
CO2 SERPL-SCNC: 17 MMOL/L (ref 21–32)
CREAT SERPL-MCNC: 0.73 MG/DL (ref 0.6–1.3)
EOSINOPHIL # BLD AUTO: 0 10^3/UL (ref 0–0.3)
EOSINOPHIL NFR BLD AUTO: 0 % (ref 0–10)
FIBRINOGEN PPP-MCNC: (no result) MG/DL
GFR SERPLBLD BASED ON 1.73 SQ M-ARVRAT: > 60 ML/MIN
GLUCOSE SERPL-MCNC: 86 MG/DL (ref 70–105)
GLUCOSE UR STRIP-MCNC: NEGATIVE MG/DL
HCT VFR BLD CALC: 40 % (ref 35–52)
HGB BLD-MCNC: 12.8 G/DL (ref 11.5–16)
KETONES UR QL STRIP: (no result)
LEUKOCYTE ESTERASE UR QL STRIP: (no result)
LYMPHOCYTES # BLD AUTO: 0.4 X 10^3 (ref 1–4)
LYMPHOCYTES NFR BLD AUTO: 3 % (ref 12–44)
MCH RBC QN AUTO: 30 PG (ref 25–34)
MCHC RBC AUTO-ENTMCNC: 32 G/DL (ref 32–36)
MCV RBC AUTO: 95 FL (ref 80–99)
MONOCYTES # BLD AUTO: 0.6 X 10^3 (ref 0–1)
MONOCYTES NFR BLD AUTO: 5 % (ref 0–12)
MONOCYTES NFR BLD: 4 %
NEUTROPHILS # BLD AUTO: 11 X 10^3 (ref 1.8–7.8)
NEUTROPHILS NFR BLD AUTO: 91 % (ref 42–75)
NEUTS BAND NFR BLD MANUAL: 93 %
NITRITE UR QL STRIP: NEGATIVE
PH UR STRIP: 6 [PH] (ref 5–9)
PLATELET # BLD: 202 10^3/UL (ref 130–400)
PMV BLD AUTO: 10.9 FL (ref 9–12.2)
POTASSIUM SERPL-SCNC: 3.9 MMOL/L (ref 3.6–5)
PROT SERPL-MCNC: 7.2 GM/DL (ref 6.4–8.2)
PROT UR QL STRIP: (no result)
RBC #/AREA URNS HPF: (no result) /HPF
RBC MORPH BLD: NORMAL
SODIUM SERPL-SCNC: 137 MMOL/L (ref 135–145)
SP GR UR STRIP: >=1.03 (ref 1.02–1.02)
SQUAMOUS #/AREA URNS HPF: (no result) /HPF
VARIANT LYMPHS NFR BLD MANUAL: 3 %
WBC # BLD AUTO: 12.1 10^3/UL (ref 4.3–11)
WBC #/AREA URNS HPF: (no result) /HPF

## 2021-04-25 PROCEDURE — 85007 BL SMEAR W/DIFF WBC COUNT: CPT

## 2021-04-25 PROCEDURE — 85027 COMPLETE CBC AUTOMATED: CPT

## 2021-04-25 PROCEDURE — 96360 HYDRATION IV INFUSION INIT: CPT

## 2021-04-25 PROCEDURE — 80053 COMPREHEN METABOLIC PANEL: CPT

## 2021-04-25 PROCEDURE — 36415 COLL VENOUS BLD VENIPUNCTURE: CPT

## 2021-04-25 PROCEDURE — 99211 OFF/OP EST MAY X REQ PHY/QHP: CPT

## 2021-04-25 PROCEDURE — 96361 HYDRATE IV INFUSION ADD-ON: CPT

## 2021-04-25 PROCEDURE — 87088 URINE BACTERIA CULTURE: CPT

## 2021-04-25 PROCEDURE — 81000 URINALYSIS NONAUTO W/SCOPE: CPT

## 2021-04-25 RX ADMIN — ACETAMINOPHEN PRN MG: 500 TABLET ORAL at 19:37

## 2021-04-25 RX ADMIN — SODIUM CHLORIDE, SODIUM LACTATE, POTASSIUM CHLORIDE, CALCIUM CHLORIDE, AND DEXTROSE MONOHYDRATE SCH MLS/HR: 600; 310; 30; 20; 5 INJECTION, SOLUTION INTRAVENOUS at 23:43

## 2021-04-26 VITALS — DIASTOLIC BLOOD PRESSURE: 69 MMHG | SYSTOLIC BLOOD PRESSURE: 107 MMHG

## 2021-04-26 VITALS — SYSTOLIC BLOOD PRESSURE: 125 MMHG | DIASTOLIC BLOOD PRESSURE: 69 MMHG

## 2021-04-26 VITALS — SYSTOLIC BLOOD PRESSURE: 107 MMHG | DIASTOLIC BLOOD PRESSURE: 69 MMHG

## 2021-04-26 VITALS — DIASTOLIC BLOOD PRESSURE: 62 MMHG | SYSTOLIC BLOOD PRESSURE: 110 MMHG

## 2021-04-26 RX ADMIN — SODIUM CHLORIDE, SODIUM LACTATE, POTASSIUM CHLORIDE, CALCIUM CHLORIDE, AND DEXTROSE MONOHYDRATE SCH MLS/HR: 600; 310; 30; 20; 5 INJECTION, SOLUTION INTRAVENOUS at 07:35

## 2021-04-26 RX ADMIN — ACETAMINOPHEN PRN MG: 500 TABLET ORAL at 06:25

## 2021-04-26 NOTE — DISCHARGE INST-WOMEN'S SERVICE
Discharge Inst-Women's Serv


Depart Medication/Instructions


New, Converted or Re-Newed RX:  Transmitted to Pharmacy


Final Diagnosis


nausea vomiting dehydration


diarrhea


viral gastroenteritis


39 week gestation


Problems Reviewed?:  Yes





Consults/Follow Up


Additional Follow Up:  Yes (wednesday with Dr. Pendleton)





Activity


Activity:  Activity as Tolerated


NO SMOKING:  NO SMOKING


Nothing Inside Vagina:  No Douching, No [place], No Tampons





Diet


Discharge Diet:  Other Diet (bland/brat diet)


Symptoms to Report to :  Pain Increased, Fever Over 101 Degrees F, Vaginal 

Bleeding Increase, Vaginal Discharge Foul





contractions every 5 minutes for 1-2 hours


For Any Problems or Questions:  Contact Your Physician











MIC PENDLETON DO               Apr 26, 2021 08:51

## 2021-05-03 ENCOUNTER — HOSPITAL ENCOUNTER (INPATIENT)
Dept: HOSPITAL 75 - LDRP | Age: 31
LOS: 3 days | Discharge: HOME | End: 2021-05-06
Attending: OBSTETRICS & GYNECOLOGY | Admitting: OBSTETRICS & GYNECOLOGY
Payer: COMMERCIAL

## 2021-05-03 VITALS — SYSTOLIC BLOOD PRESSURE: 121 MMHG | DIASTOLIC BLOOD PRESSURE: 75 MMHG

## 2021-05-03 VITALS — SYSTOLIC BLOOD PRESSURE: 120 MMHG | DIASTOLIC BLOOD PRESSURE: 74 MMHG

## 2021-05-03 VITALS — DIASTOLIC BLOOD PRESSURE: 75 MMHG | SYSTOLIC BLOOD PRESSURE: 121 MMHG

## 2021-05-03 VITALS — SYSTOLIC BLOOD PRESSURE: 105 MMHG | DIASTOLIC BLOOD PRESSURE: 60 MMHG

## 2021-05-03 VITALS — HEIGHT: 64.02 IN | WEIGHT: 197.53 LBS | BODY MASS INDEX: 33.72 KG/M2

## 2021-05-03 DIAGNOSIS — O48.0: Primary | ICD-10-CM

## 2021-05-03 DIAGNOSIS — Z3A.40: ICD-10-CM

## 2021-05-03 DIAGNOSIS — D62: ICD-10-CM

## 2021-05-03 LAB
APTT PPP: YELLOW S
BACTERIA #/AREA URNS HPF: NEGATIVE /HPF
BASOPHILS # BLD AUTO: 0 10^3/UL (ref 0–0.1)
BASOPHILS NFR BLD AUTO: 0 % (ref 0–10)
BILIRUB UR QL STRIP: NEGATIVE
EOSINOPHIL # BLD AUTO: 0.1 10^3/UL (ref 0–0.3)
EOSINOPHIL NFR BLD AUTO: 1 % (ref 0–10)
FIBRINOGEN PPP-MCNC: CLEAR MG/DL
GLUCOSE UR STRIP-MCNC: NEGATIVE MG/DL
HCT VFR BLD CALC: 36 % (ref 35–52)
HGB BLD-MCNC: 11.7 G/DL (ref 11.5–16)
KETONES UR QL STRIP: NEGATIVE
LEUKOCYTE ESTERASE UR QL STRIP: NEGATIVE
LYMPHOCYTES # BLD AUTO: 2.3 10^3/UL (ref 1–4)
LYMPHOCYTES NFR BLD AUTO: 22 % (ref 12–44)
MANUAL DIFFERENTIAL PERFORMED BLD QL: NO
MCH RBC QN AUTO: 30 PG (ref 25–34)
MCHC RBC AUTO-ENTMCNC: 33 G/DL (ref 32–36)
MCV RBC AUTO: 91 FL (ref 80–99)
MONOCYTES # BLD AUTO: 1.2 10^3/UL (ref 0–1)
MONOCYTES NFR BLD AUTO: 12 % (ref 0–12)
NEUTROPHILS # BLD AUTO: 6.6 10^3/UL (ref 1.8–7.8)
NEUTROPHILS NFR BLD AUTO: 64 % (ref 42–75)
NITRITE UR QL STRIP: NEGATIVE
PH UR STRIP: 6 [PH] (ref 5–9)
PLATELET # BLD: 244 10^3/UL (ref 130–400)
PMV BLD AUTO: 11.2 FL (ref 9–12.2)
PROT UR QL STRIP: NEGATIVE
RBC #/AREA URNS HPF: (no result) /HPF
SP GR UR STRIP: 1.01 (ref 1.02–1.02)
SQUAMOUS #/AREA URNS HPF: (no result) /HPF
WBC # BLD AUTO: 10.3 10^3/UL (ref 4.3–11)
WBC #/AREA URNS HPF: (no result) /HPF

## 2021-05-03 PROCEDURE — 86900 BLOOD TYPING SEROLOGIC ABO: CPT

## 2021-05-03 PROCEDURE — 86850 RBC ANTIBODY SCREEN: CPT

## 2021-05-03 PROCEDURE — 86901 BLOOD TYPING SEROLOGIC RH(D): CPT

## 2021-05-03 PROCEDURE — 81000 URINALYSIS NONAUTO W/SCOPE: CPT

## 2021-05-03 PROCEDURE — 85025 COMPLETE CBC W/AUTO DIFF WBC: CPT

## 2021-05-03 PROCEDURE — 85007 BL SMEAR W/DIFF WBC COUNT: CPT

## 2021-05-03 PROCEDURE — 3E0D7GC INTRODUCTION OF OTHER THERAPEUTIC SUBSTANCE INTO MOUTH AND PHARYNX, VIA NATURAL OR ARTIFICIAL OPENING: ICD-10-PCS | Performed by: OBSTETRICS & GYNECOLOGY

## 2021-05-03 PROCEDURE — 36415 COLL VENOUS BLD VENIPUNCTURE: CPT

## 2021-05-03 PROCEDURE — 85027 COMPLETE CBC AUTOMATED: CPT

## 2021-05-03 RX ADMIN — SODIUM CHLORIDE, SODIUM LACTATE, POTASSIUM CHLORIDE, CALCIUM CHLORIDE, AND DEXTROSE MONOHYDRATE SCH MLS/HR: 600; 310; 30; 20; 5 INJECTION, SOLUTION INTRAVENOUS at 21:35

## 2021-05-04 VITALS — SYSTOLIC BLOOD PRESSURE: 112 MMHG | DIASTOLIC BLOOD PRESSURE: 73 MMHG

## 2021-05-04 VITALS — SYSTOLIC BLOOD PRESSURE: 118 MMHG | DIASTOLIC BLOOD PRESSURE: 60 MMHG

## 2021-05-04 VITALS — SYSTOLIC BLOOD PRESSURE: 123 MMHG | DIASTOLIC BLOOD PRESSURE: 72 MMHG

## 2021-05-04 VITALS — DIASTOLIC BLOOD PRESSURE: 71 MMHG | SYSTOLIC BLOOD PRESSURE: 118 MMHG

## 2021-05-04 VITALS — DIASTOLIC BLOOD PRESSURE: 71 MMHG | SYSTOLIC BLOOD PRESSURE: 120 MMHG

## 2021-05-04 VITALS — SYSTOLIC BLOOD PRESSURE: 124 MMHG | DIASTOLIC BLOOD PRESSURE: 71 MMHG

## 2021-05-04 VITALS — DIASTOLIC BLOOD PRESSURE: 65 MMHG | SYSTOLIC BLOOD PRESSURE: 113 MMHG

## 2021-05-04 VITALS — DIASTOLIC BLOOD PRESSURE: 62 MMHG | SYSTOLIC BLOOD PRESSURE: 125 MMHG

## 2021-05-04 VITALS — DIASTOLIC BLOOD PRESSURE: 65 MMHG | SYSTOLIC BLOOD PRESSURE: 125 MMHG

## 2021-05-04 VITALS — SYSTOLIC BLOOD PRESSURE: 129 MMHG | DIASTOLIC BLOOD PRESSURE: 81 MMHG

## 2021-05-04 VITALS — SYSTOLIC BLOOD PRESSURE: 125 MMHG | DIASTOLIC BLOOD PRESSURE: 78 MMHG

## 2021-05-04 VITALS — SYSTOLIC BLOOD PRESSURE: 126 MMHG | DIASTOLIC BLOOD PRESSURE: 80 MMHG

## 2021-05-04 VITALS — SYSTOLIC BLOOD PRESSURE: 129 MMHG | DIASTOLIC BLOOD PRESSURE: 84 MMHG

## 2021-05-04 VITALS — SYSTOLIC BLOOD PRESSURE: 120 MMHG | DIASTOLIC BLOOD PRESSURE: 76 MMHG

## 2021-05-04 VITALS — DIASTOLIC BLOOD PRESSURE: 77 MMHG | SYSTOLIC BLOOD PRESSURE: 127 MMHG

## 2021-05-04 VITALS — DIASTOLIC BLOOD PRESSURE: 69 MMHG | SYSTOLIC BLOOD PRESSURE: 115 MMHG

## 2021-05-04 VITALS — SYSTOLIC BLOOD PRESSURE: 132 MMHG | DIASTOLIC BLOOD PRESSURE: 72 MMHG

## 2021-05-04 VITALS — SYSTOLIC BLOOD PRESSURE: 123 MMHG | DIASTOLIC BLOOD PRESSURE: 70 MMHG

## 2021-05-04 VITALS — DIASTOLIC BLOOD PRESSURE: 74 MMHG | SYSTOLIC BLOOD PRESSURE: 115 MMHG

## 2021-05-04 VITALS — DIASTOLIC BLOOD PRESSURE: 71 MMHG | SYSTOLIC BLOOD PRESSURE: 142 MMHG

## 2021-05-04 VITALS — SYSTOLIC BLOOD PRESSURE: 116 MMHG | DIASTOLIC BLOOD PRESSURE: 65 MMHG

## 2021-05-04 VITALS — SYSTOLIC BLOOD PRESSURE: 117 MMHG | DIASTOLIC BLOOD PRESSURE: 72 MMHG

## 2021-05-04 VITALS — SYSTOLIC BLOOD PRESSURE: 139 MMHG | DIASTOLIC BLOOD PRESSURE: 74 MMHG

## 2021-05-04 VITALS — DIASTOLIC BLOOD PRESSURE: 65 MMHG | SYSTOLIC BLOOD PRESSURE: 103 MMHG

## 2021-05-04 VITALS — DIASTOLIC BLOOD PRESSURE: 85 MMHG | SYSTOLIC BLOOD PRESSURE: 144 MMHG

## 2021-05-04 VITALS — DIASTOLIC BLOOD PRESSURE: 71 MMHG | SYSTOLIC BLOOD PRESSURE: 119 MMHG

## 2021-05-04 VITALS — DIASTOLIC BLOOD PRESSURE: 78 MMHG | SYSTOLIC BLOOD PRESSURE: 120 MMHG

## 2021-05-04 VITALS — SYSTOLIC BLOOD PRESSURE: 119 MMHG | DIASTOLIC BLOOD PRESSURE: 65 MMHG

## 2021-05-04 VITALS — SYSTOLIC BLOOD PRESSURE: 123 MMHG | DIASTOLIC BLOOD PRESSURE: 78 MMHG

## 2021-05-04 VITALS — SYSTOLIC BLOOD PRESSURE: 122 MMHG | DIASTOLIC BLOOD PRESSURE: 80 MMHG

## 2021-05-04 VITALS — DIASTOLIC BLOOD PRESSURE: 82 MMHG | SYSTOLIC BLOOD PRESSURE: 131 MMHG

## 2021-05-04 VITALS — SYSTOLIC BLOOD PRESSURE: 130 MMHG | DIASTOLIC BLOOD PRESSURE: 70 MMHG

## 2021-05-04 VITALS — DIASTOLIC BLOOD PRESSURE: 64 MMHG | SYSTOLIC BLOOD PRESSURE: 115 MMHG

## 2021-05-04 VITALS — SYSTOLIC BLOOD PRESSURE: 124 MMHG | DIASTOLIC BLOOD PRESSURE: 73 MMHG

## 2021-05-04 VITALS — SYSTOLIC BLOOD PRESSURE: 123 MMHG | DIASTOLIC BLOOD PRESSURE: 76 MMHG

## 2021-05-04 VITALS — SYSTOLIC BLOOD PRESSURE: 130 MMHG | DIASTOLIC BLOOD PRESSURE: 60 MMHG

## 2021-05-04 VITALS — DIASTOLIC BLOOD PRESSURE: 72 MMHG | SYSTOLIC BLOOD PRESSURE: 119 MMHG

## 2021-05-04 VITALS — SYSTOLIC BLOOD PRESSURE: 117 MMHG | DIASTOLIC BLOOD PRESSURE: 68 MMHG

## 2021-05-04 VITALS — DIASTOLIC BLOOD PRESSURE: 58 MMHG | SYSTOLIC BLOOD PRESSURE: 114 MMHG

## 2021-05-04 VITALS — DIASTOLIC BLOOD PRESSURE: 65 MMHG | SYSTOLIC BLOOD PRESSURE: 124 MMHG

## 2021-05-04 VITALS — DIASTOLIC BLOOD PRESSURE: 81 MMHG | SYSTOLIC BLOOD PRESSURE: 121 MMHG

## 2021-05-04 VITALS — SYSTOLIC BLOOD PRESSURE: 119 MMHG | DIASTOLIC BLOOD PRESSURE: 70 MMHG

## 2021-05-04 VITALS — DIASTOLIC BLOOD PRESSURE: 68 MMHG | SYSTOLIC BLOOD PRESSURE: 116 MMHG

## 2021-05-04 VITALS — SYSTOLIC BLOOD PRESSURE: 114 MMHG | DIASTOLIC BLOOD PRESSURE: 71 MMHG

## 2021-05-04 VITALS — SYSTOLIC BLOOD PRESSURE: 121 MMHG | DIASTOLIC BLOOD PRESSURE: 76 MMHG

## 2021-05-04 VITALS — SYSTOLIC BLOOD PRESSURE: 130 MMHG | DIASTOLIC BLOOD PRESSURE: 76 MMHG

## 2021-05-04 VITALS — SYSTOLIC BLOOD PRESSURE: 127 MMHG | DIASTOLIC BLOOD PRESSURE: 79 MMHG

## 2021-05-04 VITALS — DIASTOLIC BLOOD PRESSURE: 74 MMHG | SYSTOLIC BLOOD PRESSURE: 131 MMHG

## 2021-05-04 VITALS — DIASTOLIC BLOOD PRESSURE: 65 MMHG | SYSTOLIC BLOOD PRESSURE: 114 MMHG

## 2021-05-04 VITALS — SYSTOLIC BLOOD PRESSURE: 130 MMHG | DIASTOLIC BLOOD PRESSURE: 85 MMHG

## 2021-05-04 VITALS — SYSTOLIC BLOOD PRESSURE: 123 MMHG | DIASTOLIC BLOOD PRESSURE: 77 MMHG

## 2021-05-04 VITALS — SYSTOLIC BLOOD PRESSURE: 128 MMHG | DIASTOLIC BLOOD PRESSURE: 68 MMHG

## 2021-05-04 VITALS — DIASTOLIC BLOOD PRESSURE: 72 MMHG | SYSTOLIC BLOOD PRESSURE: 113 MMHG

## 2021-05-04 VITALS — SYSTOLIC BLOOD PRESSURE: 122 MMHG | DIASTOLIC BLOOD PRESSURE: 71 MMHG

## 2021-05-04 VITALS — DIASTOLIC BLOOD PRESSURE: 68 MMHG | SYSTOLIC BLOOD PRESSURE: 119 MMHG

## 2021-05-04 VITALS — DIASTOLIC BLOOD PRESSURE: 73 MMHG | SYSTOLIC BLOOD PRESSURE: 124 MMHG

## 2021-05-04 VITALS — DIASTOLIC BLOOD PRESSURE: 73 MMHG | SYSTOLIC BLOOD PRESSURE: 125 MMHG

## 2021-05-04 VITALS — SYSTOLIC BLOOD PRESSURE: 131 MMHG | DIASTOLIC BLOOD PRESSURE: 62 MMHG

## 2021-05-04 VITALS — DIASTOLIC BLOOD PRESSURE: 65 MMHG | SYSTOLIC BLOOD PRESSURE: 128 MMHG

## 2021-05-04 VITALS — SYSTOLIC BLOOD PRESSURE: 127 MMHG | DIASTOLIC BLOOD PRESSURE: 78 MMHG

## 2021-05-04 VITALS — SYSTOLIC BLOOD PRESSURE: 129 MMHG | DIASTOLIC BLOOD PRESSURE: 72 MMHG

## 2021-05-04 VITALS — DIASTOLIC BLOOD PRESSURE: 60 MMHG | SYSTOLIC BLOOD PRESSURE: 135 MMHG

## 2021-05-04 VITALS — DIASTOLIC BLOOD PRESSURE: 78 MMHG | SYSTOLIC BLOOD PRESSURE: 117 MMHG

## 2021-05-04 VITALS — DIASTOLIC BLOOD PRESSURE: 61 MMHG | SYSTOLIC BLOOD PRESSURE: 115 MMHG

## 2021-05-04 VITALS — SYSTOLIC BLOOD PRESSURE: 144 MMHG | DIASTOLIC BLOOD PRESSURE: 72 MMHG

## 2021-05-04 VITALS — DIASTOLIC BLOOD PRESSURE: 67 MMHG | SYSTOLIC BLOOD PRESSURE: 116 MMHG

## 2021-05-04 VITALS — SYSTOLIC BLOOD PRESSURE: 116 MMHG | DIASTOLIC BLOOD PRESSURE: 70 MMHG

## 2021-05-04 VITALS — DIASTOLIC BLOOD PRESSURE: 72 MMHG | SYSTOLIC BLOOD PRESSURE: 122 MMHG

## 2021-05-04 VITALS — DIASTOLIC BLOOD PRESSURE: 75 MMHG | SYSTOLIC BLOOD PRESSURE: 118 MMHG

## 2021-05-04 VITALS — SYSTOLIC BLOOD PRESSURE: 117 MMHG | DIASTOLIC BLOOD PRESSURE: 74 MMHG

## 2021-05-04 RX ADMIN — SODIUM CHLORIDE, SODIUM LACTATE, POTASSIUM CHLORIDE, CALCIUM CHLORIDE, AND DEXTROSE MONOHYDRATE SCH MLS/HR: 600; 310; 30; 20; 5 INJECTION, SOLUTION INTRAVENOUS at 13:05

## 2021-05-04 RX ADMIN — SODIUM CHLORIDE, SODIUM LACTATE, POTASSIUM CHLORIDE, CALCIUM CHLORIDE, AND DEXTROSE MONOHYDRATE SCH MLS/HR: 600; 310; 30; 20; 5 INJECTION, SOLUTION INTRAVENOUS at 05:32

## 2021-05-04 RX ADMIN — SODIUM CHLORIDE, SODIUM LACTATE, POTASSIUM CHLORIDE, CALCIUM CHLORIDE, AND DEXTROSE MONOHYDRATE SCH MLS/HR: 600; 310; 30; 20; 5 INJECTION, SOLUTION INTRAVENOUS at 19:15

## 2021-05-04 NOTE — HISTORY & PHYSICAL-OB
OB - Chief Complaint & HPI


Date/Time


Date of Admission:


Date of Admission:  May 3, 2021 at 19:58


Date seen by a Provider:  May 4, 2021


Time Seen by a Provider:  07:40





Chief Complaint/History


OB-Reason for Admission/Chief:  Induction of Labor (at 40 weeks to prevent the 

complications of post maturity)


Hx :  1


Hx Para:  0


Expected Date of Delivery:  May 3, 2021


Gestational Age in Weeks:  40


Gestational Age in Days:  0


Indication for induction:  post dates


Other reason for admission:


Pregnancy complicated by threatened  labor at 33 weeks.  tocolysis with 

procardia. betamethsone and GBS prophylaxis was also done.  the patient had a 

shingle outbreak at around 32 weeks and completed a course of valcyclovir.


Admission Nurse Assessment Rev:  Yes


History of Labs


O+/-


HbSAg -


HIV -


Hep C -


Rub I


syphilis -


GBS -





Allergies and Home Medications


Allergies


Coded Allergies:  


     No Known Drug Allergies (Unverified , 21)





Home Medications


Pnv Cmb#21/Iron/Folic Acid 1 Each Tablet, 1 EACH PO DAILY, (Reported)


   Last Action: Reviewed





Patient Home Medication List


Home Medication List Reviewed:  Yes





OB - History


Hx of Present Pregnancy


Ultrasounds:  Normal mid trimester US


Obstetrical Complications:  None


Medical Complications:  None





Prenatal Information


Pregnancy Induced Hypertension:  No


Maternal Gestational Diabetes:  No


Postpartum Hemorrhage:  No





Obstetrical History


Hx :  1





Patient Past Medical History





recent shingles out break





Social History/Family History


Alcohol Use:  Denies Use


Smoking Cessation:  Never smoker


2nd Hand Smoke Exposure:  No





Immunizations


Hepatitis B:  Yes


Tetanus Booster (TDap):  Less than 5yrs


Date of Influenza Vaccine:  Oct 29, 2020


Rubella:  immune


RPR/VDRL:  Negative


GBS Status:  Negative


HBsAG:  Negative





OB - Admission Exam


Physical Exam


Vitals:





Vital Signs








 5/3/21 5/4/21 5/4/21 5/4/21





 20:25 13:40 17:55 18:55


 


Temp   37.0 


 


Pulse    86


 


Resp    18


 


B/P (MAP)    131/82 (98)


 


Pulse Ox  100  


 


O2 Delivery Room Air   








Heart:  Rhythm Normal


Lungs:  Clear


Abdomen:  Gravid


Extremities:  Edema


Reflexes:  Normal


Cervical Dilatation:  2cm


Effacement:  50%


Station:  -3


Membranes:  Intact


Fetal Heart Rate:  140's


Accelerations:  Accelerations Present


Decelerations:  No Decelerations


Short Term Variability:  Present


Long Term Variability:  Average (6-25)


Contractions on Admission:  6-10 Minutes Apart





OB - Assessment/Plan/Diagnosis


Assessment


Assessment:  IUFD


Admission Dx


Plan misoprostol cervical ripening and then augmentation as needed


Anticipate 





Peds - [facility]


Admission Status:  Inpatient Order (span 2 midnights) (labor)


Reason for Inpatient Admission:  


labor











MIC PENDLETON DO                May 4, 2021 21:53

## 2021-05-05 VITALS — DIASTOLIC BLOOD PRESSURE: 67 MMHG | SYSTOLIC BLOOD PRESSURE: 111 MMHG

## 2021-05-05 VITALS — DIASTOLIC BLOOD PRESSURE: 67 MMHG | SYSTOLIC BLOOD PRESSURE: 115 MMHG

## 2021-05-05 VITALS — DIASTOLIC BLOOD PRESSURE: 74 MMHG | SYSTOLIC BLOOD PRESSURE: 123 MMHG

## 2021-05-05 VITALS — DIASTOLIC BLOOD PRESSURE: 64 MMHG | SYSTOLIC BLOOD PRESSURE: 126 MMHG

## 2021-05-05 VITALS — DIASTOLIC BLOOD PRESSURE: 64 MMHG | SYSTOLIC BLOOD PRESSURE: 128 MMHG

## 2021-05-05 VITALS — SYSTOLIC BLOOD PRESSURE: 115 MMHG | DIASTOLIC BLOOD PRESSURE: 77 MMHG

## 2021-05-05 VITALS — DIASTOLIC BLOOD PRESSURE: 79 MMHG | SYSTOLIC BLOOD PRESSURE: 129 MMHG

## 2021-05-05 VITALS — SYSTOLIC BLOOD PRESSURE: 128 MMHG | DIASTOLIC BLOOD PRESSURE: 89 MMHG

## 2021-05-05 VITALS — DIASTOLIC BLOOD PRESSURE: 95 MMHG | SYSTOLIC BLOOD PRESSURE: 126 MMHG

## 2021-05-05 VITALS — DIASTOLIC BLOOD PRESSURE: 74 MMHG | SYSTOLIC BLOOD PRESSURE: 133 MMHG

## 2021-05-05 VITALS — SYSTOLIC BLOOD PRESSURE: 133 MMHG | DIASTOLIC BLOOD PRESSURE: 80 MMHG

## 2021-05-05 VITALS — DIASTOLIC BLOOD PRESSURE: 75 MMHG | SYSTOLIC BLOOD PRESSURE: 124 MMHG

## 2021-05-05 VITALS — DIASTOLIC BLOOD PRESSURE: 69 MMHG | SYSTOLIC BLOOD PRESSURE: 113 MMHG

## 2021-05-05 PROCEDURE — 0KQM0ZZ REPAIR PERINEUM MUSCLE, OPEN APPROACH: ICD-10-PCS | Performed by: OBSTETRICS & GYNECOLOGY

## 2021-05-05 RX ADMIN — VITAMIN A ACETATE, .BETA.-CAROTENE, ASCORBIC ACID, CHOLECALCIFEROL, .ALPHA.-TOCOPHEROL ACETATE, DL-, THIAMINE MONONITRATE, RIBOFLAVIN, NIACINAMIDE, PYRIDOXINE HYDROCHLORIDE, FOLIC ACID, CYANOCOBALAMIN, CALCIUM CARBONATE, FERROUS FUMARATE, ZINC OXIDE, AND CUPRIC OXIDE SCH EA: 2000; 2000; 120; 400; 22; 1.84; 3; 20; 10; 1; 12; 200; 27; 25; 2 TABLET ORAL at 09:04

## 2021-05-05 RX ADMIN — ACETAMINOPHEN SCH MG: 500 TABLET ORAL at 06:30

## 2021-05-05 RX ADMIN — DOCUSATE SODIUM SCH MG: 100 CAPSULE ORAL at 20:50

## 2021-05-05 RX ADMIN — IBUPROFEN SCH MG: 600 TABLET ORAL at 15:25

## 2021-05-05 RX ADMIN — BENZOCAINE AND LEVOMENTHOL PRN EA: 200; 5 SPRAY TOPICAL at 01:52

## 2021-05-05 RX ADMIN — ACETAMINOPHEN SCH MG: 500 TABLET ORAL at 23:12

## 2021-05-05 RX ADMIN — FERROUS SULFATE TAB 325 MG (65 MG ELEMENTAL FE) SCH MG: 325 (65 FE) TAB at 09:04

## 2021-05-05 RX ADMIN — IBUPROFEN SCH MG: 600 TABLET ORAL at 09:04

## 2021-05-05 RX ADMIN — IBUPROFEN SCH MG: 600 TABLET ORAL at 01:53

## 2021-05-05 RX ADMIN — IBUPROFEN SCH MG: 600 TABLET ORAL at 20:50

## 2021-05-05 RX ADMIN — DOCUSATE SODIUM SCH MG: 100 CAPSULE ORAL at 09:04

## 2021-05-05 RX ADMIN — ACETAMINOPHEN SCH MG: 500 TABLET ORAL at 15:25

## 2021-05-05 NOTE — ANESTHESIA-REGIONAL POST-OP
Regional


Patient Condition


Mental Status:  Alert, Oriented x3


Circulation:  Same as Pre-Op


Headache:  Absent


Sensation:  Full Recovery


Motor Block:  Absent





Post Op Complications


Complications


None





Follow Up Care/Instructions


Patient Instructions


None needed.





Anesthesia/Patient Condition


Patient is doing well, no complaints, stable vital signs, no apparent adverse 

anesthesia problems.   


No complications reported per nursing.











MIKEY KRISHNAN CRNA             May 5, 2021 08:37

## 2021-05-05 NOTE — OB LABOR & DELIVERY RECORD
Vag Delivery Note


Vag Delivery Note


Date of Delivery: 21 





Preoperative Diagnosis: Tianna Denton  is a 31  /Para  1 / 0,Gestational 

Age 40 weeks for cervical ripening to prevent the complications of post maturity


Postoperative Diagnosis: Same


Surgeon: MIC PENDLETON 





Assistant: Cal Perdue, MS III





Anesthesia: epidural





Delivery Type: vacuum assisted vaginal





Findings: 


Viable female infant, apgars 8/9, weight pending


Lacerations:  2nd degree and bilateral vaginal wall lacerations 


Intact placenta with 3 vessel cord. No nuchal cord, body cord or shoulder 

dystocia








Estimated Blood Loss: 300 ml





Complications: None





Condition: Stable





Description of Procedure:





The patient is a 31 year old female who presented yesterday for cervical 

ripening. She was admitted and informed consent was obtained. Her labor course 

was remarkable for misoprostol for several doses, AROM and pitocin augmentation 

She progressed to complete dilatation and began to push. 





She was then set up for delivery. She pushed for over 3 hours.  But she was able

to deliver vaginally.  at +2 station the kiwi vacuum was placed and with three 

pushes and no popoffs, the infant's head was delivered atraumatically in the OA 

position. The shoulders and remainder of the infant's body were then delivered 

without difficulty. Upon delivery, the head was held below the level of the 

perineum and the mouth and nares were bulb suctioned. The cord was doubly 

clamped and cut and the infant was handed off to the pediatric staff. An intact 

placenta with 3-vessel cord delivered via In[place] and there was found to be 

minimal bleeding.~ Vigorous fundal massage was performed and the fundus was 

found to be firm. IV oxytocin was given. Examination of the vagina and perineum 

revealed a 2nd and bilateral vaginal wall laceration repaired in the usual 

fashion with 3-0 vicryl suture. Following the repair, sponge, instrument and 

needle counts were correct. Mom and baby were both in stable condition in the 

labor suite.





Vitals - Labs


Vital Signs - I&O





Vital Signs








  Date Time  Temp Pulse Resp B/P (MAP) Pulse Ox O2 Delivery O2 Flow Rate FiO2


 


21 18:55  86 18 131/82 (98)    


 


21 18:40  70 18 125/78 (94)    


 


21 18:25  76 18 123/76 (92)    


 


21 18:10  70 18 129/72 (91)    


 


21 17:55 37.0 74 18 125/65 (85)    


 


21 17:40  78 18 129/81 (97)    


 


21 17:25  66 18 124/71 (88)    


 


21 17:10  67 18 123/72 (89)    


 


21 16:55 37.2 80 18 144/72 (96)    


 


21 16:40  77 18 122/80 (94)    


 


21 16:25  81 18 131/74 (93)    


 


21 16:10  68 18 127/78 (94)    


 


21 15:55  72 18 115/64 (81)    


 


21 15:40  62 18 115/61 (79)    


 


21 15:25  68 18 114/58 (76)    


 


21 15:10 36.8 68 18 118/60 (79)    


 


21 14:55  58 18 123/78 (93)    


 


21 14:40  61 18 118/75 (89)    


 


21 14:25  60 18 124/73 (90)    


 


21 14:10  58 18 130/70 (90)    


 


21 13:55  59 18 119/72 (88)    


 


21 13:40 36.7 60 18 124/65 (84) 100   


 


21 13:25  62 18 118/71 (87) 100   


 


21 13:10  62 18 128/68 (88) 100   


 


21 12:55  61 18 128/65 (86) 100   


 


21 12:38 36.5 65 18 117/74 (88) 100   


 


21 12:35  63 18 117/72 (87) 100   


 


21 12:32  62 18 114/71 (85) 100   


 


21 12:29  65 18 115/69 (84) 99   


 


21 12:26  65 18 116/68 (84) 99   


 


21 12:23  64 18 116/65 (82) 98   


 


21 12:20  65 18 119/70 (86) 99   


 


21 12:17  66 18 120/71 (87) 99   


 


21 12:14  82 18 122/72 (89) 98   


 


21 12:11  71 18 123/72 (89) 98   


 


21 12:08  80 18 123/70 (87) 97   


 


21 12:05  77 18 117/68 (84)    


 


21 12:02  82 18 120/78 (92) 97   


 


21 11:59  83 18 116/70 (85) 97   


 


21 11:56  78 18 113/72 (86) 97   


 


21 11:53  82 18 121/81 (94) 98   


 


21 11:50  74 18 119/71 (87) 100   


 


21 11:46  85 18 119/71 (87) 100   


 


21 11:43  75 18 124/73 (90)    


 


21 11:40 37.1 70 18 130/76 (94) 100   


 


21 11:35  63 18 142/71 (94)    


 


21 11:15  68 18 125/73 (90)    


 


21 10:15  76 18 122/71 (88)    


 


21 09:15  65 18 127/79 (95)    


 


21 08:15  71 18 115/74 (88)    


 


21 07:40 36.8       


 


21 07:15  68 18 114/65 (81)    


 


21 07:00        


 


21 06:30  63 18 132/72 (92)    


 


21 06:00        


 


21 05:30 36.9 72 18 126/80 (95)    


 


21 05:00        


 


21 04:30  65 18 112/73 (86)    


 


21 04:00        


 


21 03:30 36.7 68 18 103/65 (78)    


 


21 03:00        


 


21 02:30  62 18 120/76 (91)    


 


21 02:00 36.7       


 


21 01:30  62 18 116/67 (83)    


 


21 01:00        


 


21 00:30  69 18 119/65 (83)    














I & O 


 


 21





 07:00


 


Intake Total 1000 ml


 


Balance 1000 ml

















MIC PENDLETON DO                May 5, 2021 00:15

## 2021-05-05 NOTE — POSTPARTUM PROGRESS NOTE
Postpartum Note


Postpartum Note


Postpartum Day # 1 s/p 





Subjective:


Patient is without complaints. Ambulating, voiding. Tolerating a regular diet w

ithout nausea or vomiting. Normal lochia. Pain is well controlled with oral pain

medications. breast feeding. 





Objective:











 21





 21:00 21:15 21:30 21:45


 


Pulse 112 110  91


 


Resp 18 18 18 18


 


B/P (MAP) 119/68 (85) 121/76 (91)  113/65 (81)





 21





 22:00 22:15 22:30 22:45


 


Temp 36.1  37.3 37.2


 


Pulse 103 91 93 96


 


Resp 18 18 18 18


 


B/P (MAP) 135/60 (85) 130/60 (83) 123/77 (92) 117/78 (91)


 


    





 21





 23:00 23:01 23:15 23:30


 


Temp 37.0 37.3  


 


Pulse   114 107


 


Resp   18 18


 


B/P (MAP)   139/74 (95) 130/85 (100)


 


    





 21





 23:38 00:00 00:15 00:30


 


Temp   37.0 


 


Pulse  106 105 112


 


Resp  18 18 18


 


B/P (MAP)  113/69 (84) 115/77 (90) 133/74 (93)


 


    





 21





 00:38 00:53 01:08 01:23


 


Pulse 114 102 111 107


 


Resp 18 18 18 18


 


B/P (MAP) 128/89 (102) 126/95 (105) 129/79 (96) 133/80 (97)





 21  





 02:09 06:28  


 


Temp 36.8 36.6  


 


Pulse 94 70  


 


Resp 18 18  


 


B/P (MAP) 123/74 (90) 111/67 (82)  


 


Pulse Ox 98 98  


 


O2 Delivery Room Air Room Air  











Physical Exam:


General - Alert and oriented, no apparent distress


Abdomen - Soft, appropriately tender to palpation, non-distended, fundus firm at

umbilicus


Extremities - no edema, negative Vonda's bilaterally 








Assessment:


1 post-partum day # 1, status post vacuum assisted vaginal delivery.


Recovering well, hemodynamically stable





[]








Plan:


Routine postpartum care.


Encourage breast feeding.


Encourage ambulation.


Ferrous sulfate supplementation.


Plan for discharge []





Vitals - Labs


Vital Signs - I&O





Vital Signs








  Date Time  Temp Pulse Resp B/P (MAP) Pulse Ox O2 Delivery O2 Flow Rate FiO2


 


21 06:28 36.6 70 18 111/67 (82) 98 Room Air  


 


21 02:09 36.8 94 18 123/74 (90) 98 Room Air  


 


21 01:23  107 18 133/80 (97)    


 


21 01:08  111 18 129/79 (96)    


 


21 00:53  102 18 126/95 (105)    


 


21 00:38  114 18 128/89 (102)    


 


21 00:30  112 18 133/74 (93)    


 


21 00:15 37.0 105 18 115/77 (90)    


 


21 00:00  106 18 113/69 (84)    


 


21 23:38        


 


21 23:30  107 18 130/85 (100)    


 


21 23:15  114 18 139/74 (95)    


 


21 23:01 37.3       


 


21 23:00 37.0       


 


21 22:45 37.2 96 18 117/78 (91)    


 


21 22:30 37.3 93 18 123/77 (92)    


 


21 22:15  91 18 130/60 (83)    


 


21 22:00 36.1 103 18 135/60 (85)    


 


21 21:45  91 18 113/65 (81)    


 


21 21:30   18     


 


21 21:15  110 18 121/76 (91)    


 


21 21:00  112 18 119/68 (85)    


 


21 20:45  106 18 125/62 (83)    


 


21 20:30  108 18 131/62 (85)    


 


21 20:15 37.1 106 18 144/85 (104)    


 


21 20:00  96 18 129/84 (99)    


 


21 19:45  84 18 120/78 (92)    


 


21 19:30  81 18 127/77 (94)    


 


21 18:55  86 18 131/82 (98)    


 


21 18:40  70 18 125/78 (94)    


 


21 18:25  76 18 123/76 (92)    


 


21 18:10  70 18 129/72 (91)    


 


21 17:55 37.0 74 18 125/65 (85)    


 


21 17:40  78 18 129/81 (97)    


 


21 17:25  66 18 124/71 (88)    


 


21 17:10  67 18 123/72 (89)    


 


21 16:55 37.2 80 18 144/72 (96)    


 


21 16:40  77 18 122/80 (94)    


 


21 16:25  81 18 131/74 (93)    


 


21 16:10  68 18 127/78 (94)    


 


21 15:55  72 18 115/64 (81)    


 


21 15:40  62 18 115/61 (79)    


 


21 15:25  68 18 114/58 (76)    


 


21 15:10 36.8 68 18 118/60 (79)    


 


21 14:55  58 18 123/78 (93)    


 


21 14:40  61 18 118/75 (89)    


 


21 14:25  60 18 124/73 (90)    


 


21 14:10  58 18 130/70 (90)    


 


21 13:55  59 18 119/72 (88)    


 


21 13:40 36.7 60 18 124/65 (84) 100   


 


21 13:25  62 18 118/71 (87) 100   


 


21 13:10  62 18 128/68 (88) 100   


 


21 12:55  61 18 128/65 (86) 100   


 


21 12:38 36.5 65 18 117/74 (88) 100   


 


21 12:35  63 18 117/72 (87) 100   


 


21 12:32  62 18 114/71 (85) 100   


 


21 12:29  65 18 115/69 (84) 99   


 


21 12:26  65 18 116/68 (84) 99   


 


21 12:23  64 18 116/65 (82) 98   


 


21 12:20  65 18 119/70 (86) 99   


 


21 12:17  66 18 120/71 (87) 99   


 


21 12:14  82 18 122/72 (89) 98   


 


21 12:11  71 18 123/72 (89) 98   


 


21 12:08  80 18 123/70 (87) 97   


 


21 12:05  77 18 117/68 (84)    


 


21 12:02  82 18 120/78 (92) 97   


 


21 11:59  83 18 116/70 (85) 97   


 


21 11:56  78 18 113/72 (86) 97   


 


21 11:53  82 18 121/81 (94) 98   


 


21 11:50  74 18 119/71 (87) 100   


 


21 11:46  85 18 119/71 (87) 100   


 


21 11:43  75 18 124/73 (90)    


 


21 11:40 37.1 70 18 130/76 (94) 100   


 


21 11:35  63 18 142/71 (94)    


 


21 11:15  68 18 125/73 (90)    


 


21 10:15  76 18 122/71 (88)    


 


21 09:15  65 18 127/79 (95)    














I & O 


 


 21





 07:00


 


Intake Total 2000 ml


 


Balance 2000 ml

















MIC PENDLETON DO                May 5, 2021 08:53

## 2021-05-06 VITALS — SYSTOLIC BLOOD PRESSURE: 126 MMHG | DIASTOLIC BLOOD PRESSURE: 73 MMHG

## 2021-05-06 VITALS — SYSTOLIC BLOOD PRESSURE: 137 MMHG | DIASTOLIC BLOOD PRESSURE: 67 MMHG

## 2021-05-06 LAB
BASOPHILS # BLD AUTO: 0.1 10^3/UL (ref 0–0.1)
BASOPHILS NFR BLD AUTO: 0 % (ref 0–10)
EOSINOPHIL # BLD AUTO: 0.3 10^3/UL (ref 0–0.3)
EOSINOPHIL NFR BLD AUTO: 2 % (ref 0–10)
HCT VFR BLD CALC: 28 % (ref 35–52)
HGB BLD-MCNC: 9.2 G/DL (ref 11.5–16)
LYMPHOCYTES # BLD AUTO: 2.8 10^3/UL (ref 1–4)
LYMPHOCYTES NFR BLD AUTO: 18 % (ref 12–44)
MANUAL DIFFERENTIAL PERFORMED BLD QL: YES
MCH RBC QN AUTO: 30 PG (ref 25–34)
MCHC RBC AUTO-ENTMCNC: 33 G/DL (ref 32–36)
MCV RBC AUTO: 90 FL (ref 80–99)
MONOCYTES # BLD AUTO: 1.5 10^3/UL (ref 0–1)
MONOCYTES NFR BLD AUTO: 10 % (ref 0–12)
MONOCYTES NFR BLD: 7 %
NEUTROPHILS # BLD AUTO: 10.7 10^3/UL (ref 1.8–7.8)
NEUTROPHILS NFR BLD AUTO: 70 % (ref 42–75)
NEUTS BAND NFR BLD MANUAL: 75 %
PLATELET # BLD: 226 10^3/UL (ref 130–400)
PMV BLD AUTO: 11 FL (ref 9–12.2)
RBC MORPH BLD: NORMAL
VARIANT LYMPHS NFR BLD MANUAL: 18 %
WBC # BLD AUTO: 15.5 10^3/UL (ref 4.3–11)

## 2021-05-06 RX ADMIN — FERROUS SULFATE TAB 325 MG (65 MG ELEMENTAL FE) SCH MG: 325 (65 FE) TAB at 08:28

## 2021-05-06 RX ADMIN — DOCUSATE SODIUM SCH MG: 100 CAPSULE ORAL at 08:29

## 2021-05-06 RX ADMIN — IBUPROFEN SCH MG: 600 TABLET ORAL at 11:11

## 2021-05-06 RX ADMIN — IBUPROFEN SCH MG: 600 TABLET ORAL at 03:24

## 2021-05-06 RX ADMIN — BENZOCAINE AND LEVOMENTHOL PRN EA: 200; 5 SPRAY TOPICAL at 11:15

## 2021-05-06 RX ADMIN — ACETAMINOPHEN SCH MG: 500 TABLET ORAL at 08:29

## 2021-05-06 RX ADMIN — VITAMIN A ACETATE, .BETA.-CAROTENE, ASCORBIC ACID, CHOLECALCIFEROL, .ALPHA.-TOCOPHEROL ACETATE, DL-, THIAMINE MONONITRATE, RIBOFLAVIN, NIACINAMIDE, PYRIDOXINE HYDROCHLORIDE, FOLIC ACID, CYANOCOBALAMIN, CALCIUM CARBONATE, FERROUS FUMARATE, ZINC OXIDE, AND CUPRIC OXIDE SCH EA: 2000; 2000; 120; 400; 22; 1.84; 3; 20; 10; 1; 12; 200; 27; 25; 2 TABLET ORAL at 08:28

## 2021-05-06 NOTE — DISCHARGE INST-WOMEN'S SERVICE
Discharge Inst-Women's Serv


Depart Medication/Instructions


New, Converted or Re-Newed RX:  Transmitted to Pharmacy


Final Diagnosis


vaginal delivery


post date induction


40 weeks


acute blood loss anemia


vaginal laceration


Problems Reviewed?:  Yes





Consults/Follow Up


Additional Follow Up:  Yes (1 week follow up to follow up on perineal exam; 6 

week post partum)





Activity


Activity:  Activity as Tolerated


Driving Instructions:  You May Drive


NO SMOKING:  NO SMOKING


Nothing Inside Vagina:  No Douching, No [place], No Tampons





Diet


Discharge Diet:  No Restrictions


Symptoms to Report to :  Pain Increased, Fever Over 101 Degrees F, Vaginal 

Bleeding Increase, Cramps in Feet or Legs, Vaginal Discharge Foul


For Any Problems or Questions:  Contact Your Physician





Skin/Wound Care


Bathing Instructions:  MIC Elizabeth DO                May 6, 2021 08:43

## 2021-05-06 NOTE — POSTPARTUM PROGRESS NOTE
Postpartum Note


Postpartum Note


Postpartum Day # 1 s/p , vaginal laceration


2nd deg perineal laceration


fever in labor





Subjective:


Patient is without complaints. Ambulating, voiding. Tolerating a regular diet 

without nausea or vomiting. Normal lochia. Pain is well controlled with oral 

pain medications. breast feeding.





Objective:





Laboratory Tests








Test


 21


04:39 Range/Units


 


 


White Blood Count


 15.5 H


 4.3-11.0


10^3/uL


 


Red Blood Count


 3.08 L


 3.80-5.11


10^6/uL


 


Hemoglobin 9.2 #L 11.5-16.0  g/dL


 


Hematocrit 28 L 35-52  %


 


Mean Corpuscular Volume 90  80-99  fL


 


Mean Corpuscular Hemoglobin 30  25-34  pg


 


Mean Corpuscular Hemoglobin


Concent 33 


 32-36  g/dL





 


Red Cell Distribution Width 14.0  10.0-14.5  %


 


Platelet Count


 226 


 130-400


10^3/uL


 


Mean Platelet Volume 11.0  9.0-12.2  fL


 


Immature Granulocyte % (Auto) 1   %


 


Neutrophils (%) (Auto) 70  42-75  %


 


Lymphocytes (%) (Auto) 18  12-44  %


 


Monocytes (%) (Auto) 10  0-12  %


 


Eosinophils (%) (Auto) 2  0-10  %


 


Basophils (%) (Auto) 0  0-10  %


 


Neutrophils # (Auto)


 10.7 H


 1.8-7.8


10^3/uL


 


Lymphocytes # (Auto)


 2.8 


 1.0-4.0


10^3/uL


 


Monocytes # (Auto)


 1.5 H


 0.0-1.0


10^3/uL


 


Eosinophils # (Auto)


 0.3 


 0.0-0.3


10^3/uL


 


Basophils # (Auto)


 0.1 


 0.0-0.1


10^3/uL


 


Immature Granulocyte # (Auto)


 0.1 


 0.0-0.1


10^3/uL


 


Neutrophils % (Manual) 75   %


 


Lymphocytes % (Manual) 18   %


 


Monocytes % (Manual) 7   %


 


Blood Morphology Comment NORMAL   

















 21





 20:55 03:15


 


Temp 36.5 36.1


 


Pulse 82 75


 


Resp 18 18


 


B/P (MAP) 128/64 (85) 137/67 (90)


 


O2 Delivery Room Air Room Air








Physical Exam:


General - Alert and oriented, no apparent distress


Abdomen - Soft, appropriately tender to palpation, non-distended, fundus firm at

umbilicus


Extremities - no edema, negative Vonda's bilaterally 


[]





Assessment:


1. post-partum day #1 , status post VA vaginal delivery.


Recovering well, hemodynamically stable


2. acute blood loss anemia








Plan:


Routine postpartum care.


Encourage breast feeding.


Encourage ambulation.


Ferrous sulfate supplementation.


Plan for discharge today





Vitals - Labs


Vital Signs - I&O





Vital Signs








  Date Time  Temp Pulse Resp B/P (MAP) Pulse Ox O2 Delivery O2 Flow Rate FiO2


 


21 03:15 36.1 75 18 137/67 (90)  Room Air  


 


21 20:55 36.5 82 18 128/64 (85)  Room Air  


 


21 17:30 36.5 68 18 126/64 (84) 97 Room Air  


 


21 12:30 36.1 70 18 124/75 (91) 97 Room Air  


 


21 09:00 36.3 72 18 115/67 (83) 98 Room Air  











Labs


Laboratory Tests


21 04:39: 


White Blood Count 15.5H, Red Blood Count 3.08L, Hemoglobin 9.2#L, Hematocrit 28L

, Mean Corpuscular Volume 90, Mean Corpuscular Hemoglobin 30, Mean Corpuscular 

Hemoglobin Concent 33, Red Cell Distribution Width 14.0, Platelet Count 226, 

Mean Platelet Volume 11.0, Immature Granulocyte % (Auto) 1, Neutrophils (%) 

(Auto) 70, Lymphocytes (%) (Auto) 18, Monocytes (%) (Auto) 10, Eosinophils (%) 

(Auto) 2, Basophils (%) (Auto) 0, Neutrophils # (Auto) 10.7H, Lymphocytes # 

(Auto) 2.8, Monocytes # (Auto) 1.5H, Eosinophils # (Auto) 0.3, Basophils # 

(Auto) 0.1, Immature Granulocyte # (Auto) 0.1, Neutrophils % (Manual) 75, 

Lymphocytes % (Manual) 18, Monocytes % (Manual) 7, Blood Morphology Comment N

MIC MIJARES DO                May 6, 2021 08:40

## 2021-05-12 NOTE — ANTEPARTUM PROGRESS NOTE
Antepartum Progress


Antepartum Progress


Date Seen by Provider:  2021


Time Seen by Provider:  08:00


Patient presented to labor and delivery yesterday with complaint of vomiting and

diarrhea.  She was given IV antiemetics and IV fluids. CMP was wnl


She was feeling better after a liter of fluid, but due to continued emesis, we 

opted to give her more fluid (SG was 1.030 and + ketones) due to dehyrdation.  

Pregnancy complicated by PTL halted with Procardia, but she has not had further 

contractions on this visit. She is 38 + weeks so also monitored in case she has 

labor.  





Subjective: 


Denies complaints.  Activity:  as tolerated.  Tolerating bland diet.  Fetus is 

active.  No loss of fluid, vaginal bleeding, or contractions.  She still has 

nausea, but better with the PO Zofran.





Objective:


she appears to feel better and has not had any further emesis. 





Physical Exam


General - alert and oriented, no apparent distress


Abdomen - Soft, gravid, non-tender to palpation


Ext - non-tender to palpation, no edema, negative Vonda's bilaterally


Fetal heart tones: present and reactive


Tocometer: rare but not palpable





Assessment: 


Tianna Denton  is a 31  /Para  1 / 0,Gestational Age 38 6/7 weeks with 

N/V/D and dehydration





Plan:


Continue current plan.  Will DC home with immodium prn and po zofran.  Will 

continue bland/brat diet.  Appointment scheduled for this week.  


Prenatal vitamin daily.


VTE prophylaxis: ambulation











MIC PENDLETON DO               May 12, 2021 15:06